# Patient Record
Sex: MALE | Race: WHITE | NOT HISPANIC OR LATINO
[De-identification: names, ages, dates, MRNs, and addresses within clinical notes are randomized per-mention and may not be internally consistent; named-entity substitution may affect disease eponyms.]

---

## 2017-05-24 ENCOUNTER — TRANSCRIPTION ENCOUNTER (OUTPATIENT)
Age: 67
End: 2017-05-24

## 2017-05-24 DIAGNOSIS — R39.9 UNSPECIFIED SYMPTOMS AND SIGNS INVOLVING THE GENITOURINARY SYSTEM: ICD-10-CM

## 2017-05-26 ENCOUNTER — LABORATORY RESULT (OUTPATIENT)
Age: 67
End: 2017-05-26

## 2017-05-26 ENCOUNTER — APPOINTMENT (OUTPATIENT)
Dept: UROLOGY | Facility: CLINIC | Age: 67
End: 2017-05-26

## 2017-05-26 VITALS — BODY MASS INDEX: 27.13 KG/M2 | HEIGHT: 68 IN | WEIGHT: 179 LBS

## 2017-05-26 DIAGNOSIS — Z80.6 FAMILY HISTORY OF LEUKEMIA: ICD-10-CM

## 2017-05-26 DIAGNOSIS — K57.90 DIVERTICULOSIS OF INTESTINE, PART UNSPECIFIED, W/OUT PERFORATION OR ABSCESS W/OUT BLEEDING: ICD-10-CM

## 2017-05-26 DIAGNOSIS — R30.0 DYSURIA: ICD-10-CM

## 2017-05-26 RX ORDER — ALPRAZOLAM 0.25 MG/1
0.25 TABLET ORAL
Refills: 0 | Status: ACTIVE | COMMUNITY

## 2017-05-26 RX ORDER — CICLESONIDE 80 UG/1
80 AEROSOL, METERED RESPIRATORY (INHALATION)
Qty: 6 | Refills: 0 | Status: ACTIVE | COMMUNITY
Start: 2017-01-31

## 2017-05-30 LAB
APPEARANCE: CLEAR
BACTERIA UR CULT: NORMAL
BACTERIA: NEGATIVE
BILIRUBIN URINE: NEGATIVE
BLOOD URINE: NEGATIVE
COLOR: ABNORMAL
GLUCOSE QUALITATIVE U: NORMAL MG/DL
HYALINE CASTS: 1 /LPF
KETONES URINE: NEGATIVE
LEUKOCYTE ESTERASE URINE: NEGATIVE
MICROSCOPIC-UA: NORMAL
NITRITE URINE: NEGATIVE
PH URINE: 5.5
PROTEIN URINE: NEGATIVE MG/DL
PSA SERPL-MCNC: 5.14 NG/ML
RED BLOOD CELLS URINE: 2 /HPF
SPECIFIC GRAVITY URINE: 1.03
SQUAMOUS EPITHELIAL CELLS: 0 /HPF
UROBILINOGEN URINE: NORMAL MG/DL
WHITE BLOOD CELLS URINE: 1 /HPF

## 2017-06-09 ENCOUNTER — APPOINTMENT (OUTPATIENT)
Dept: UROLOGY | Facility: CLINIC | Age: 67
End: 2017-06-09

## 2017-06-12 ENCOUNTER — APPOINTMENT (OUTPATIENT)
Dept: UROLOGY | Facility: CLINIC | Age: 67
End: 2017-06-12

## 2017-06-23 ENCOUNTER — MOBILE ON CALL (OUTPATIENT)
Age: 67
End: 2017-06-23

## 2017-06-23 ENCOUNTER — TRANSCRIPTION ENCOUNTER (OUTPATIENT)
Age: 67
End: 2017-06-23

## 2017-06-27 ENCOUNTER — MEDICATION RENEWAL (OUTPATIENT)
Age: 67
End: 2017-06-27

## 2017-07-24 ENCOUNTER — MEDICATION RENEWAL (OUTPATIENT)
Age: 67
End: 2017-07-24

## 2017-07-31 ENCOUNTER — APPOINTMENT (OUTPATIENT)
Dept: UROLOGY | Facility: CLINIC | Age: 67
End: 2017-07-31
Payer: MEDICARE

## 2017-07-31 PROCEDURE — 99213 OFFICE O/P EST LOW 20 MIN: CPT

## 2017-07-31 RX ORDER — SOLIFENACIN SUCCINATE 5 MG/1
5 TABLET, FILM COATED ORAL
Qty: 30 | Refills: 1 | Status: DISCONTINUED | COMMUNITY
Start: 2017-05-26 | End: 2017-07-31

## 2017-08-01 LAB
APPEARANCE: CLEAR
BACTERIA: NEGATIVE
BILIRUBIN URINE: NEGATIVE
BLOOD URINE: NEGATIVE
COLOR: ABNORMAL
GLUCOSE QUALITATIVE U: NORMAL MG/DL
KETONES URINE: NEGATIVE
LEUKOCYTE ESTERASE URINE: NEGATIVE
MICROSCOPIC-UA: NORMAL
NITRITE URINE: NEGATIVE
PH URINE: 6
PROTEIN URINE: NEGATIVE MG/DL
RED BLOOD CELLS URINE: 1 /HPF
SPECIFIC GRAVITY URINE: 1.02
SQUAMOUS EPITHELIAL CELLS: 0 /HPF
UROBILINOGEN URINE: NORMAL MG/DL
WHITE BLOOD CELLS URINE: 0 /HPF

## 2017-08-29 ENCOUNTER — MEDICATION RENEWAL (OUTPATIENT)
Age: 67
End: 2017-08-29

## 2017-09-22 ENCOUNTER — APPOINTMENT (OUTPATIENT)
Dept: UROLOGY | Facility: CLINIC | Age: 67
End: 2017-09-22

## 2018-03-12 ENCOUNTER — APPOINTMENT (OUTPATIENT)
Dept: UROLOGY | Facility: CLINIC | Age: 68
End: 2018-03-12
Payer: MEDICARE

## 2018-03-12 DIAGNOSIS — R37 SEXUAL DYSFUNCTION, UNSPECIFIED: ICD-10-CM

## 2018-03-12 DIAGNOSIS — R21 RASH AND OTHER NONSPECIFIC SKIN ERUPTION: ICD-10-CM

## 2018-03-12 PROCEDURE — 99214 OFFICE O/P EST MOD 30 MIN: CPT

## 2018-03-13 ENCOUNTER — TRANSCRIPTION ENCOUNTER (OUTPATIENT)
Age: 68
End: 2018-03-13

## 2018-03-13 LAB
PSA FREE FLD-MCNC: 18.3
PSA FREE SERPL-MCNC: 1.07 NG/ML
PSA SERPL-MCNC: 5.85 NG/ML

## 2018-03-21 LAB
4K SCORE CALCULATION: 2 %
FREE PSA: 1.09 NG/ML
PERCENT FREE PSA: 19 %
TOTAL PSA: 5.7 NG/ML

## 2018-04-12 ENCOUNTER — RX RENEWAL (OUTPATIENT)
Age: 68
End: 2018-04-12

## 2018-06-15 ENCOUNTER — OTHER (OUTPATIENT)
Age: 68
End: 2018-06-15

## 2018-06-19 ENCOUNTER — LABORATORY RESULT (OUTPATIENT)
Age: 68
End: 2018-06-19

## 2018-06-19 ENCOUNTER — TRANSCRIPTION ENCOUNTER (OUTPATIENT)
Age: 68
End: 2018-06-19

## 2018-06-21 LAB — BACTERIA UR CULT: NORMAL

## 2018-07-05 ENCOUNTER — RX RENEWAL (OUTPATIENT)
Age: 68
End: 2018-07-05

## 2018-08-28 ENCOUNTER — APPOINTMENT (OUTPATIENT)
Dept: UROLOGY | Facility: CLINIC | Age: 68
End: 2018-08-28
Payer: MEDICARE

## 2018-08-28 ENCOUNTER — LABORATORY RESULT (OUTPATIENT)
Age: 68
End: 2018-08-28

## 2018-08-28 VITALS
HEART RATE: 50 BPM | OXYGEN SATURATION: 94 % | BODY MASS INDEX: 27.58 KG/M2 | WEIGHT: 182 LBS | SYSTOLIC BLOOD PRESSURE: 126 MMHG | DIASTOLIC BLOOD PRESSURE: 69 MMHG | HEIGHT: 68 IN

## 2018-08-28 PROCEDURE — 99213 OFFICE O/P EST LOW 20 MIN: CPT

## 2018-08-29 LAB
APPEARANCE: CLEAR
BACTERIA: NEGATIVE
BILIRUBIN URINE: NEGATIVE
BLOOD URINE: NEGATIVE
COLOR: ABNORMAL
GLUCOSE QUALITATIVE U: NEGATIVE MG/DL
HYALINE CASTS: 3 /LPF
KETONES URINE: NEGATIVE
LEUKOCYTE ESTERASE URINE: NEGATIVE
MICROSCOPIC-UA: NORMAL
NITRITE URINE: NEGATIVE
PH URINE: 5.5
PROTEIN URINE: NEGATIVE MG/DL
PSA SERPL-MCNC: 4.39 NG/ML
RED BLOOD CELLS URINE: 1 /HPF
SPECIFIC GRAVITY URINE: 1.03
SQUAMOUS EPITHELIAL CELLS: 0 /HPF
UROBILINOGEN URINE: NEGATIVE MG/DL
WHITE BLOOD CELLS URINE: 1 /HPF

## 2018-08-30 ENCOUNTER — LABORATORY RESULT (OUTPATIENT)
Age: 68
End: 2018-08-30

## 2018-09-17 ENCOUNTER — APPOINTMENT (OUTPATIENT)
Dept: UROLOGY | Facility: CLINIC | Age: 68
End: 2018-09-17
Payer: MEDICARE

## 2018-09-17 PROCEDURE — 51798 US URINE CAPACITY MEASURE: CPT

## 2018-09-17 PROCEDURE — 99213 OFFICE O/P EST LOW 20 MIN: CPT | Mod: 25

## 2018-09-21 ENCOUNTER — TRANSCRIPTION ENCOUNTER (OUTPATIENT)
Age: 68
End: 2018-09-21

## 2018-09-24 ENCOUNTER — RX RENEWAL (OUTPATIENT)
Age: 68
End: 2018-09-24

## 2018-09-26 ENCOUNTER — RX RENEWAL (OUTPATIENT)
Age: 68
End: 2018-09-26

## 2018-09-26 ENCOUNTER — MOBILE ON CALL (OUTPATIENT)
Age: 68
End: 2018-09-26

## 2018-10-04 ENCOUNTER — TRANSCRIPTION ENCOUNTER (OUTPATIENT)
Age: 68
End: 2018-10-04

## 2018-10-04 ENCOUNTER — RX RENEWAL (OUTPATIENT)
Age: 68
End: 2018-10-04

## 2018-10-04 ENCOUNTER — MOBILE ON CALL (OUTPATIENT)
Age: 68
End: 2018-10-04

## 2019-03-20 ENCOUNTER — TRANSCRIPTION ENCOUNTER (OUTPATIENT)
Age: 69
End: 2019-03-20

## 2019-08-22 LAB
PSA FREE FLD-MCNC: 22 %
PSA FREE SERPL-MCNC: 0.43 NG/ML
PSA SERPL-MCNC: 1.94 NG/ML

## 2019-08-23 ENCOUNTER — APPOINTMENT (OUTPATIENT)
Dept: UROLOGY | Facility: CLINIC | Age: 69
End: 2019-08-23
Payer: MEDICARE

## 2019-08-23 DIAGNOSIS — N50.3 CYST OF EPIDIDYMIS: ICD-10-CM

## 2019-08-23 DIAGNOSIS — B37.2 CANDIDIASIS OF SKIN AND NAIL: ICD-10-CM

## 2019-08-23 LAB
APPEARANCE: CLEAR
BACTERIA: NEGATIVE
BILIRUBIN URINE: NEGATIVE
BLOOD URINE: NEGATIVE
COLOR: YELLOW
GLUCOSE QUALITATIVE U: NEGATIVE
HYALINE CASTS: 0 /LPF
KETONES URINE: NEGATIVE
LEUKOCYTE ESTERASE URINE: NEGATIVE
MICROSCOPIC-UA: NORMAL
NITRITE URINE: NEGATIVE
PH URINE: 6
PROTEIN URINE: NORMAL
RED BLOOD CELLS URINE: 2 /HPF
SPECIFIC GRAVITY URINE: 1.03
SQUAMOUS EPITHELIAL CELLS: 1 /HPF
UROBILINOGEN URINE: NORMAL
WHITE BLOOD CELLS URINE: 1 /HPF

## 2019-08-23 PROCEDURE — 99213 OFFICE O/P EST LOW 20 MIN: CPT | Mod: 25

## 2019-08-23 PROCEDURE — 51798 US URINE CAPACITY MEASURE: CPT

## 2019-08-23 RX ORDER — NYSTATIN AND TRIAMCINOLONE ACETONIDE 100000; 1 MG/G; MG/G
100000-0.1 CREAM TOPICAL TWICE DAILY
Qty: 5 | Refills: 0 | Status: ACTIVE | COMMUNITY
Start: 2019-08-23 | End: 1900-01-01

## 2019-08-23 RX ORDER — FINASTERIDE 5 MG/1
5 TABLET, FILM COATED ORAL DAILY
Qty: 30 | Refills: 1 | Status: ACTIVE | COMMUNITY
Start: 2018-08-28 | End: 1900-01-01

## 2019-08-23 RX ORDER — MIRABEGRON 50 MG/1
50 TABLET, FILM COATED, EXTENDED RELEASE ORAL DAILY
Qty: 90 | Refills: 0 | Status: DISCONTINUED | COMMUNITY
Start: 2017-07-31 | End: 2019-08-23

## 2019-08-23 NOTE — ASSESSMENT
[FreeTextEntry1] : 69 year old with long history of urinary symptoms\par On Uroxatral, finasteride and Myrbetric\par Still complains of frequency and urgency which is variable.\par Discussed attempting to increase Myrbetric.  Patient thinks he had previously attempted and did not tolerate.\par Will attempt and assess.\par \par Discussed asymptomatic epididymal cyst.\par \par BPH on prior biopsy\par PSA reduced on finasteride appropriately\par \par Discussed candidal intertriginous rash\par Will treat with nystatin\par

## 2019-08-23 NOTE — HISTORY OF PRESENT ILLNESS
[FreeTextEntry1] : Patient has had long history of urinary frequency treated with uroxatral.\par Started on vesicare recently\par No clear improvement in urinary frequency \par No clear improvement in nocturia.\par Anxious about driving and symptoms. \par \par Patient is currently experiencing urinary urgency, but no urinary incontinence and no urinary retention \par The patient presents with complaints of urinary frequency (variable somewhat better on vesicare). \par The patient presents with complaints of 2 episodes of nocturia. \par \par 3.12.2018\par on Uroxatral and Myrbetric\par improved urinary symptoms\par symptoms worse when early in the morning\par less of problem in PM\par worse when drinking diet\par \par 8.28.2018\par on Myrbetric \par on Uroxatral\par variable response to medications\par \par  \par 9.17.2018\par this weekend \par increased urinary symptoms\par feeling of incomplete emptying\par no dysuria\par masturbation weekly\par \par  \par 8.23.2019

## 2019-08-23 NOTE — PHYSICAL EXAM
[General Appearance - Well Developed] : well developed [Bowel Sounds] : normal bowel sounds [Urethral Meatus] : meatus normal [Testes Tenderness] : no tenderness of the testes [Penis Abnormality] : normal circumcised penis [Prostate Enlargement] : the prostate was not enlarged [Prostate Tenderness] : the prostate was not tender [Heart Rate And Rhythm] : Heart rate and rhythm were normal [Exaggerated Use Of Accessory Muscles For Inspiration] : no accessory muscle use [Oriented To Time, Place, And Person] : oriented to person, place, and time [Normal Station and Gait] : the gait and station were normal for the patient's age [No Focal Deficits] : no focal deficits [Inguinal Lymph Nodes Enlarged Bilaterally] : inguinal [FreeTextEntry1] : candida rash  intrigonal

## 2019-08-26 DIAGNOSIS — N39.0 URINARY TRACT INFECTION, SITE NOT SPECIFIED: ICD-10-CM

## 2019-08-26 DIAGNOSIS — A49.9 URINARY TRACT INFECTION, SITE NOT SPECIFIED: ICD-10-CM

## 2019-08-26 LAB — BACTERIA UR CULT: ABNORMAL

## 2019-09-07 ENCOUNTER — MOBILE ON CALL (OUTPATIENT)
Age: 69
End: 2019-09-07

## 2019-09-24 ENCOUNTER — APPOINTMENT (OUTPATIENT)
Dept: ORTHOPEDIC SURGERY | Facility: CLINIC | Age: 69
End: 2019-09-24
Payer: MEDICARE

## 2019-09-24 VITALS — WEIGHT: 182 LBS | RESPIRATION RATE: 16 BRPM | HEIGHT: 68 IN | BODY MASS INDEX: 27.58 KG/M2

## 2019-09-24 PROCEDURE — 20605 DRAIN/INJ JOINT/BURSA W/O US: CPT | Mod: RT

## 2019-09-24 PROCEDURE — 73110 X-RAY EXAM OF WRIST: CPT | Mod: 50

## 2019-09-24 PROCEDURE — 99214 OFFICE O/P EST MOD 30 MIN: CPT | Mod: 25

## 2020-05-21 ENCOUNTER — APPOINTMENT (OUTPATIENT)
Dept: UROLOGY | Facility: CLINIC | Age: 70
End: 2020-05-21
Payer: MEDICARE

## 2020-05-21 DIAGNOSIS — A60.00 HERPESVIRAL INFECTION OF UROGENITAL SYSTEM, UNSPECIFIED: ICD-10-CM

## 2020-05-21 DIAGNOSIS — Z85.850 PERSONAL HISTORY OF MALIGNANT NEOPLASM OF THYROID: ICD-10-CM

## 2020-05-21 DIAGNOSIS — Z98.62 PERIPHERAL VASCULAR ANGIOPLASTY STATUS: ICD-10-CM

## 2020-05-21 PROCEDURE — 99214 OFFICE O/P EST MOD 30 MIN: CPT | Mod: 95

## 2020-05-21 RX ORDER — TICAGRELOR 60 MG/1
TABLET ORAL
Refills: 0 | Status: ACTIVE | COMMUNITY

## 2020-05-21 RX ORDER — ASPIRIN 81 MG/1
81 TABLET, CHEWABLE ORAL
Refills: 0 | Status: ACTIVE | COMMUNITY

## 2020-05-21 NOTE — ASSESSMENT
[FreeTextEntry1] : 69 year old with long history of urinary symptoms\par On Uroxatral, finasteride and Myrbetric\par Primary concern is unpredictable nature of symptoms\par When playing golf and occupied does not experience frequency\par DIscussed impact of fluid intake, ETOH etc on variable symptoms.\par Nocturia improved.\par Discussed attempting to increase Myrbetric.  Patient thinks he had previously attempted and did not tolerate.\par Does not want to attempt again\par \par Discussed recurrent hematuria since starting Brilinta\par Sounds like initial hematuria not total but persists for several days.  \par DIscussed fact that since this is occurring in spite of finasteride the need for evaluation with cystoscopy\par Referred to Dr Brenner or Ronnie.\par Emphasized the need for evaluation\par Patient reluctant and emphasized importance\par Patient agrees to proceed.\par \par \par Discussed asymptomatic epididymal cyst.\par \par BPH on prior biopsy\par PSA reduced on finasteride appropriately\par referred to Lon Trujillo ; Kanu Brenner\par \par discussed need for cystoscopy\par Discussed candidal intertriginous rash\par Will treat with nystatin\par

## 2020-05-21 NOTE — HISTORY OF PRESENT ILLNESS
[Other Location: e.g. School (Enter Location, City,State)___] : at [unfilled], at the time of the visit. [Urinary Frequency] : urinary frequency [Nocturia] : nocturia [Home] : at home, [unfilled] , at the time of the visit. [Other Location: e.g. Home (Enter Location, City,State)___] : at [unfilled] [Verbal consent obtained from patient] : the patient, [unfilled] [FreeTextEntry1] : Patient has had long history of urinary frequency treated with uroxatral.\par Started on vesicare recently\par No clear improvement in urinary frequency \par No clear improvement in nocturia.\par Anxious about driving and symptoms. \par \par Patient is currently experiencing urinary urgency, but no urinary incontinence and no urinary retention \par The patient presents with complaints of urinary frequency (variable somewhat better on vesicare). \par The patient presents with complaints of 2 episodes of nocturia. \par \par 3.12.2018\par on Uroxatral and Myrbetric\par improved urinary symptoms\par symptoms worse when early in the morning\par less of problem in PM\par worse when drinking diet\par \par 8.28.2018\par on Myrbetric \par on Uroxatral\par variable response to medications\par \par  \par 9.17.2018\par this weekend \par increased urinary symptoms\par feeling of incomplete emptying\par no dysuria\par masturbation weekly\par \par  \par 8.23.2019\par \par 5.21.2020\par The patient-doctor relationship has been established in a face to face fashion via real time video/audio HIPAA compliant communication using telemedicine software.  The patient's identity has been confirmed.  The patient was previously emailed a copy of the telemedicine consent.  They have had a chance to review and has now given verbal consent and has requested care to be assessed and treated through telemedicine and understands there maybe limitations in this process and they may need further follow up care in the office and or hospital settings.   \par THE FOLLOWING WAS READ TO AND CONSENTED TO BY PATIENT\par By virtue of my participation in this telehealth visit, I am consenting to receive care through telehealth. Telehealth is the use of electronic information and communication technologies by providers to deliver health care to patients at a distance. \par I understand that any care provided to me through [employer]’s telehealth application (“the [facility] candy”) will incorporate security protocols to protect the privacy and security of my health information. If any other application is used to provide care to me, I understand that the technology may not contain appropriate security protocols to protect the privacy and security of my health information. My provider has explained to me the risks associated with the technology platforms that he or she is using to provide care to me. I acknowledge that there are potential risks associated with any technology used while obtaining care through telehealth, including, but not limited to, connectively interruptions, other technical difficulties, and unauthorized access by a third party to one’s health information. Despite these risks, I agree to participate in the telehealth encounter. \par I understand and agree that I or my healthcare provider may terminate a telehealth encounter at any time in the event of a technical malfunction. \par I also understand that my location determines where medicine is being practiced. As a result, I will inform my provider where I am located at the time of my telehealth visit. \par I understand that there may be costs associated with a telehealth visit. I agree that I am responsible for any fees associated with the telehealth services that I receive. \par This Informed Consent for Telehealth Services During a Public Health Emergency will remain in effect solely during the term of the public health emergency.\par \par Verbal consent given on May 21, 2020  12:00PM by Everett Sierra from Jolie RITO MAEBERMAN \par  \par AMWELL NO VIDEO CONNECtiON or AUDIO\par Doximity RESCUE \par \par \par urinary symptom\par on ASA Brilinta\par s/p angioplasty\par s/p thyroid cancer surgery\par hematospermia occasional\par patient states he has had initial hematuria? 4-5 or days in a row

## 2020-05-21 NOTE — HISTORY OF PRESENT ILLNESS
[Other Location: e.g. School (Enter Location, City,State)___] : at [unfilled], at the time of the visit. [Urinary Frequency] : urinary frequency [Nocturia] : nocturia [Home] : at home, [unfilled] , at the time of the visit. [Other Location: e.g. Home (Enter Location, City,State)___] : at [unfilled] [Verbal consent obtained from patient] : the patient, [unfilled] [FreeTextEntry1] : Patient has had long history of urinary frequency treated with uroxatral.\par Started on vesicare recently\par No clear improvement in urinary frequency \par No clear improvement in nocturia.\par Anxious about driving and symptoms. \par \par Patient is currently experiencing urinary urgency, but no urinary incontinence and no urinary retention \par The patient presents with complaints of urinary frequency (variable somewhat better on vesicare). \par The patient presents with complaints of 2 episodes of nocturia. \par \par 3.12.2018\par on Uroxatral and Myrbetric\par improved urinary symptoms\par symptoms worse when early in the morning\par less of problem in PM\par worse when drinking diet\par \par 8.28.2018\par on Myrbetric \par on Uroxatral\par variable response to medications\par \par  \par 9.17.2018\par this weekend \par increased urinary symptoms\par feeling of incomplete emptying\par no dysuria\par masturbation weekly\par \par  \par 8.23.2019\par \par 5.21.2020\par The patient-doctor relationship has been established in a face to face fashion via real time video/audio HIPAA compliant communication using telemedicine software.  The patient's identity has been confirmed.  The patient was previously emailed a copy of the telemedicine consent.  They have had a chance to review and has now given verbal consent and has requested care to be assessed and treated through telemedicine and understands there maybe limitations in this process and they may need further follow up care in the office and or hospital settings.   \par THE FOLLOWING WAS READ TO AND CONSENTED TO BY PATIENT\par By virtue of my participation in this telehealth visit, I am consenting to receive care through telehealth. Telehealth is the use of electronic information and communication technologies by providers to deliver health care to patients at a distance. \par I understand that any care provided to me through [employer]’s telehealth application (“the [employer]”) will incorporate security protocols to protect the privacy and security of my health information. If any other application is used to provide care to me, I understand that the technology may not contain appropriate security protocols to protect the privacy and security of my health information. My provider has explained to me the risks associated with the technology platforms that he or she is using to provide care to me. I acknowledge that there are potential risks associated with any technology used while obtaining care through telehealth, including, but not limited to, connectively interruptions, other technical difficulties, and unauthorized access by a third party to one’s health information. Despite these risks, I agree to participate in the telehealth encounter. \par I understand and agree that I or my healthcare provider may terminate a telehealth encounter at any time in the event of a technical malfunction. \par I also understand that my location determines where medicine is being practiced. As a result, I will inform my provider where I am located at the time of my telehealth visit. \par I understand that there may be costs associated with a telehealth visit. I agree that I am responsible for any fees associated with the telehealth services that I receive. \par This Informed Consent for Telehealth Services During a Public Health Emergency will remain in effect solely during the term of the public health emergency.\par \par Verbal consent given on May 21, 2020  12:00PM by Everett Sierra from Jolie RITO MAEBERMAN \par  \par AMWELL NO VIDEO CONNECtiON or AUDIO\par Doximity RESCUE \par \par \par urinary symptom\par on ASA Brilinta\par s/p angioplasty\par s/p thyroid cancer surgery\par hematospermia occasional\par patient states he has had initial hematuria? 4-5 or days in a row

## 2020-07-02 ENCOUNTER — APPOINTMENT (OUTPATIENT)
Dept: UROLOGY | Facility: CLINIC | Age: 70
End: 2020-07-02
Payer: MEDICARE

## 2020-07-02 VITALS
HEART RATE: 54 BPM | TEMPERATURE: 98.1 F | DIASTOLIC BLOOD PRESSURE: 81 MMHG | HEIGHT: 68 IN | WEIGHT: 184 LBS | BODY MASS INDEX: 27.89 KG/M2 | OXYGEN SATURATION: 98 % | RESPIRATION RATE: 17 BRPM | SYSTOLIC BLOOD PRESSURE: 186 MMHG

## 2020-07-02 VITALS — SYSTOLIC BLOOD PRESSURE: 180 MMHG | HEART RATE: 55 BPM | DIASTOLIC BLOOD PRESSURE: 88 MMHG | OXYGEN SATURATION: 98 %

## 2020-07-02 PROCEDURE — 52000 CYSTOURETHROSCOPY: CPT

## 2020-08-06 ENCOUNTER — APPOINTMENT (OUTPATIENT)
Dept: UROLOGY | Facility: CLINIC | Age: 70
End: 2020-08-06
Payer: MEDICARE

## 2020-08-06 PROCEDURE — 99213 OFFICE O/P EST LOW 20 MIN: CPT | Mod: 95

## 2020-08-06 NOTE — HISTORY OF PRESENT ILLNESS
[Other Location: e.g. School (Enter Location, City,State)___] : at [unfilled], at the time of the visit. [FreeTextEntry1] : Patient has had long history of urinary frequency treated with uroxatral.\par Started on vesicare recently\par No clear improvement in urinary frequency \par No clear improvement in nocturia.\par Anxious about driving and symptoms. \par \par Patient is currently experiencing urinary urgency, but no urinary incontinence and no urinary retention \par The patient presents with complaints of urinary frequency (variable somewhat better on vesicare). \par The patient presents with complaints of 2 episodes of nocturia. \par \par 3.12.2018\par on Uroxatral and Myrbetric\par improved urinary symptoms\par symptoms worse when early in the morning\par less of problem in PM\par worse when drinking diet\par \par 8.28.2018\par on Myrbetric \par on Uroxatral\par variable response to medications\par \par  \par 9.17.2018\par this weekend \par increased urinary symptoms\par feeling of incomplete emptying\par no dysuria\par masturbation weekly\par \par  \par 8.23.2019\par \par 5.21.2020\par urinary symptom\par on ASA Brilinta\par s/p angioplasty\par s/p thyroid cancer surgery\par \par 8.6.2020\par \par patient seen for diarrhea\par found to have ecoli \par had covid test\par treated with cipro resolved diarrha\par The patient-doctor relationship has been established in a face to face fashion via real time video/audio HIPAA compliant communication using telemedicine software.  The patient's identity has been confirmed.  The patient was previously emailed a copy of the telemedicine consent.  They have had a chance to review and has now given verbal consent and has requested care to be assessed and treated through telemedicine and understands there maybe limitations in this process and they may need further follow up care in the office and or hospital settings.   \par THE FOLLOWING WAS READ TO AND CONSENTED TO BY PATIENT\par By virtue of my participation in this telehealth visit, I am consenting to receive care through telehealth. Telehealth is the use of electronic information and communication technologies by providers to deliver health care to patients at a distance. \par I understand that any care provided to me through [employer]’s telehealth application (“the [email or web address] candy”) will incorporate security protocols to protect the privacy and security of my health information. If any other application is used to provide care to me, I understand that the technology may not contain appropriate security protocols to protect the privacy and security of my health information. My provider has explained to me the risks associated with the technology platforms that he or she is using to provide care to me. I acknowledge that there are potential risks associated with any technology used while obtaining care through telehealth, including, but not limited to, connectively interruptions, other technical difficulties, and unauthorized access by a third party to one’s health information. Despite these risks, I agree to participate in the telehealth encounter. \par I understand and agree that I or my healthcare provider may terminate a telehealth encounter at any time in the event of a technical malfunction. \par I also understand that my location determines where medicine is being practiced. As a result, I will inform my provider where I am located at the time of my telehealth visit. \par I understand that there may be costs associated with a telehealth visit. I agree that I am responsible for any fees associated with the telehealth services that I receive. \par This Informed Consent for Telehealth Services During a Public Health Emergency will remain in effect solely during the term of the public health emergency.\par \par Verbal consent given on Aug 06, 2020   4.40PM by Everett Sierra from Mr. RITO DIANE \par \par urinary symptoms seem improved after cipro\par no dysuria\par no intial stinging\par urgency improved\par  \par

## 2020-08-06 NOTE — ASSESSMENT
[FreeTextEntry1] : 69 year old with long history of urinary symptoms\par On Uroxatral, finasteride and Myrbetric\par Primary concern is unpredictable nature of symptoms\par When playing golf and occupied does not experience frequency\par DIscussed impact of fluid intake, ETOH etc on variable symptoms.\par Nocturia improved.\par Discussed attempting to increase Myrbetric.  Patient thinks he had previously attempted and did not tolerate.\par Does not want to attempt again\par \par Discussed recurrent hematuria since starting Brilinta\par Sounds like initial hematuria not total but persists for several days.  \par DIscussed fact that since this is occurring in spite of finasteride the need for evaluation with cystoscopy\par Referred to Dr Brenner or Ronnie.\par Emphasized the need for evaluation\par Patient reluctant and emphasized importance\par Patient agrees to proceed.\par \par \par Discussed asymptomatic epididymal cyst.\par \par BPH on prior biopsy\par PSA reduced on finasteride appropriately\par referred to Lon Trujillo ; Kanu Brenner\par \par discussed need for cystoscopy\par Discussed candidal intertriginous rash\par Will treat with nystatin\par \par \par 8.6.2020\par discussed urinary symptoms\par patient feels improved after cipro\par discussed negative exam , urinalysis and PSA at that time not consistent with prostatitis\par discussed avoiding unnecessary antibiotics\par no evidence for further antibiotics\par continues to have mild GI \par f/u [place] [facility]\par continues on:\par 1. alfuzosin \par 2. myrbetriq\par 3. finasteride\par

## 2020-08-06 NOTE — HISTORY OF PRESENT ILLNESS
[Other Location: e.g. School (Enter Location, City,State)___] : at [unfilled], at the time of the visit. [FreeTextEntry1] : Patient has had long history of urinary frequency treated with uroxatral.\par Started on vesicare recently\par No clear improvement in urinary frequency \par No clear improvement in nocturia.\par Anxious about driving and symptoms. \par \par Patient is currently experiencing urinary urgency, but no urinary incontinence and no urinary retention \par The patient presents with complaints of urinary frequency (variable somewhat better on vesicare). \par The patient presents with complaints of 2 episodes of nocturia. \par \par 3.12.2018\par on Uroxatral and Myrbetric\par improved urinary symptoms\par symptoms worse when early in the morning\par less of problem in PM\par worse when drinking diet\par \par 8.28.2018\par on Myrbetric \par on Uroxatral\par variable response to medications\par \par  \par 9.17.2018\par this weekend \par increased urinary symptoms\par feeling of incomplete emptying\par no dysuria\par masturbation weekly\par \par  \par 8.23.2019\par \par 5.21.2020\par urinary symptom\par on ASA Brilinta\par s/p angioplasty\par s/p thyroid cancer surgery\par \par 8.6.2020\par \par patient seen for diarrhea\par found to have ecoli \par had covid test\par treated with cipro resolved diarrha\par The patient-doctor relationship has been established in a face to face fashion via real time video/audio HIPAA compliant communication using telemedicine software.  The patient's identity has been confirmed.  The patient was previously emailed a copy of the telemedicine consent.  They have had a chance to review and has now given verbal consent and has requested care to be assessed and treated through telemedicine and understands there maybe limitations in this process and they may need further follow up care in the office and or hospital settings.   \par THE FOLLOWING WAS READ TO AND CONSENTED TO BY PATIENT\par By virtue of my participation in this telehealth visit, I am consenting to receive care through telehealth. Telehealth is the use of electronic information and communication technologies by providers to deliver health care to patients at a distance. \par I understand that any care provided to me through [employer]’s telehealth application (“the [employer] candy”) will incorporate security protocols to protect the privacy and security of my health information. If any other application is used to provide care to me, I understand that the technology may not contain appropriate security protocols to protect the privacy and security of my health information. My provider has explained to me the risks associated with the technology platforms that he or she is using to provide care to me. I acknowledge that there are potential risks associated with any technology used while obtaining care through telehealth, including, but not limited to, connectively interruptions, other technical difficulties, and unauthorized access by a third party to one’s health information. Despite these risks, I agree to participate in the telehealth encounter. \par I understand and agree that I or my healthcare provider may terminate a telehealth encounter at any time in the event of a technical malfunction. \par I also understand that my location determines where medicine is being practiced. As a result, I will inform my provider where I am located at the time of my telehealth visit. \par I understand that there may be costs associated with a telehealth visit. I agree that I am responsible for any fees associated with the telehealth services that I receive. \par This Informed Consent for Telehealth Services During a Public Health Emergency will remain in effect solely during the term of the public health emergency.\par \par Verbal consent given on Aug 06, 2020   4.40PM by Everett Sierra from Mr. RITO DIANE \par \par urinary symptoms seem improved after cipro\par no dysuria\par no intial stinging\par urgency improved\par  \par

## 2020-09-22 ENCOUNTER — APPOINTMENT (OUTPATIENT)
Dept: ORTHOPEDIC SURGERY | Facility: CLINIC | Age: 70
End: 2020-09-22
Payer: MEDICARE

## 2020-09-22 ENCOUNTER — APPOINTMENT (OUTPATIENT)
Dept: UROLOGY | Facility: CLINIC | Age: 70
End: 2020-09-22
Payer: MEDICARE

## 2020-09-22 VITALS
SYSTOLIC BLOOD PRESSURE: 129 MMHG | TEMPERATURE: 98.1 F | WEIGHT: 184 LBS | DIASTOLIC BLOOD PRESSURE: 76 MMHG | HEIGHT: 68 IN | OXYGEN SATURATION: 97 % | BODY MASS INDEX: 27.89 KG/M2 | HEART RATE: 55 BPM

## 2020-09-22 VITALS — RESPIRATION RATE: 16 BRPM | HEIGHT: 68 IN | BODY MASS INDEX: 27.89 KG/M2 | WEIGHT: 184 LBS

## 2020-09-22 DIAGNOSIS — R36.9 URETHRAL DISCHARGE, UNSPECIFIED: ICD-10-CM

## 2020-09-22 PROCEDURE — 99213 OFFICE O/P EST LOW 20 MIN: CPT | Mod: 25

## 2020-09-22 PROCEDURE — 73130 X-RAY EXAM OF HAND: CPT | Mod: 50

## 2020-09-22 PROCEDURE — 20605 DRAIN/INJ JOINT/BURSA W/O US: CPT | Mod: RT

## 2020-09-22 PROCEDURE — 99214 OFFICE O/P EST MOD 30 MIN: CPT | Mod: 25

## 2020-09-22 PROCEDURE — 51741 ELECTRO-UROFLOWMETRY FIRST: CPT

## 2020-09-22 NOTE — PHYSICAL EXAM
[Urethral Meatus] : meatus normal [Penis Abnormality] : normal circumcised penis [Epididymis] : the epididymides were normal [Testes Tenderness] : no tenderness of the testes [Prostate Enlargement] : the prostate was not enlarged [Prostate Tenderness] : the prostate was not tender [No Prostate Nodules] : no prostate nodules [FreeTextEntry1] : no blood per urethra; non tender prostate; no blood post examination

## 2020-09-22 NOTE — HISTORY OF PRESENT ILLNESS
[FreeTextEntry1] : Patient has had long history of urinary frequency treated with Uroxatral.\par Started on VESIcare recently\par No clear improvement in urinary frequency \par No clear improvement in nocturia.\par Anxious about driving and symptoms. \par \par Patient is currently experiencing urinary urgency, but no urinary incontinence and no urinary retention \par The patient presents with complaints of urinary frequency (variable somewhat better on VESIcare). \par The patient presents with complaints of 2 episodes of nocturia. \par \par 3.12.2018\par on Uroxatral and Myrbetric\par improved urinary symptoms\par symptoms worse when early in the morning\par less of problem in PM\par worse when drinking diet\par \par 8.28.2018\par on Myrbetric \par on Uroxatral\par variable response to medications\par \par  \par 9.17.2018\par this weekend \par increased urinary symptoms\par feeling of incomplete emptying\par no dysuria\par masturbation weekly\par \par  \par 8.23.2019\par \par 5.21.2020\par urinary symptom\par on ASA Brilinta\par s/p angioplasty\par s/p thyroid cancer surgery\par \par 8.6.2020\par \par patient seen for diarrhea\par found to have Ecolid \par had covid test\par treated with Cipro resolved diarrhea\par The patient-doctor relationship has been established in a face to face fashion via real time video/audio HIPAA compliant communication using telemedicine software.  The patient's identity has been confirmed.  The patient was previously emailed a copy of the telemedicine consent.  They have had a chance to review and has now given verbal consent and has requested care to be assessed and treated through telemedicine and understands there maybe limitations in this process and they may need further follow up care in the office and or hospital settings.   \par THE FOLLOWING WAS READ TO AND CONSENTED TO BY PATIENT\par By virtue of my participation in this telehealth visit, I am consenting to receive care through telehealth. Telehealth is the use of electronic information and communication technologies by providers to deliver health care to patients at a distance. \par I understand that any care provided to me through [employer]’s telehealth application (“the [employer] candy”) will incorporate security protocols to protect the privacy and security of my health information. If any other application is used to provide care to me, I understand that the technology may not contain appropriate security protocols to protect the privacy and security of my health information. My provider has explained to me the risks associated with the technology platforms that he or she is using to provide care to me. I acknowledge that there are potential risks associated with any technology used while obtaining care through telehealth, including, but not limited to, connectively interruptions, other technical difficulties, and unauthorized access by a third party to one’s health information. Despite these risks, I agree to participate in the telehealth encounter. \par I understand and agree that I or my healthcare provider may terminate a telehealth encounter at any time in the event of a technical malfunction. \par I also understand that my location determines where medicine is being practiced. As a result, I will inform my provider where I am located at the time of my telehealth visit. \par I understand that there may be costs associated with a telehealth visit. I agree that I am responsible for any fees associated with the telehealth services that I receive. \par This Informed Consent for Telehealth Services During a Public Health Emergency will remain in effect solely during the term of the public health emergency.\par \par Verbal consent given on Aug 06, 2020   4.40PM by Everett Sierra from Jolie DIANE \par \par urinary symptoms seem improved after Cipro\par no dysuria\par no initial stinging\par urgency improved\par  \par 9.22.2020\par patient presents with approximately on week of blood per urethra with initial hematuria\par His urine is clear\par Started when changed from Eliquis  to Plavix\par At same time had ? mild trauma to urethra over edge of traveling urinal

## 2020-09-22 NOTE — ASSESSMENT
[FreeTextEntry1] : 69 year old with long history of urinary symptoms\par On Uroxatral, finasteride and Myrbetric\par Primary concern is unpredictable nature of symptoms\par When playing golf and occupied does not experience frequency\par DIscussed impact of fluid intake, ETOH etc on variable symptoms.\par Nocturia improved.\par Discussed attempting to increase Myrbetric.  Patient thinks he had previously attempted and did not tolerate.\par Does not want to attempt again\par \par Discussed recurrent hematuria since starting Brilinta\par Sounds like initial hematuria not total but persists for several days.  \par DIscussed fact that since this is occurring in spite of finasteride the need for evaluation with cystoscopy\par Referred to Dr Brenner or Ronnie.\par Emphasized the need for evaluation\par Patient reluctant and emphasized importance\par Patient agrees to proceed.\par \par \par Discussed asymptomatic epididymal cyst.\par \par BPH on prior biopsy\par PSA reduced on finasteride appropriately\par referred to Lon Trujillo ; Kanu Brenner\par \par discussed need for cystoscopy\par Discussed candidal intertriginous rash\par Will treat with nystatin\par \par \par 8.6.2020\par discussed urinary symptoms\par patient feels improved after cipro\par discussed negative exam , urinalysis and PSA at that time not consistent with prostatitis\par discussed avoiding unnecessary antibiotics\par no evidence for further antibiotics\par continues to have mild GI \par f/u [place] [facility]\par continues on:\par 1. alfuzosin \par 2. myrbetriq\par 3. finasteride\par \par \par 9.22.2020\par patient presents with approximately on week of blood per urethra with initial hematuria\par His urine is clear\par Started when changed from Eliquis  to Plavix\par At same time had ? mild trauma to urethra over edge of traveling urinal  \par no pain; decreasing and not with each urination\par urinalysis rbc with wbc; occ bacteria\par No evidence of infection on exam \par discussed ? minor trauma on antiplatelet and anticoagulant therapy\par avoid exercise etc\par will not treat as if infection because of above and recent Ecoli GI infection\par if persists will proceed with repeat cystoscopy\par \par flow noted/reviewed

## 2020-09-24 LAB — BACTERIA UR CULT: NORMAL

## 2020-12-21 PROBLEM — N39.0 UTI (URINARY TRACT INFECTION), BACTERIAL: Status: RESOLVED | Noted: 2019-08-26 | Resolved: 2020-12-21

## 2021-08-09 ENCOUNTER — APPOINTMENT (OUTPATIENT)
Dept: UROLOGY | Facility: CLINIC | Age: 71
End: 2021-08-09
Payer: MEDICARE

## 2021-08-09 VITALS — TEMPERATURE: 98.3 F

## 2021-08-09 PROCEDURE — 51741 ELECTRO-UROFLOWMETRY FIRST: CPT

## 2021-08-09 PROCEDURE — 99214 OFFICE O/P EST MOD 30 MIN: CPT

## 2021-08-09 PROCEDURE — 51798 US URINE CAPACITY MEASURE: CPT

## 2021-08-09 NOTE — ASSESSMENT
[FreeTextEntry1] : 69 year old with long history of urinary symptoms\par On Uroxatral, finasteride and Myrbetric\par Primary concern is unpredictable nature of symptoms\par When playing golf and occupied does not experience frequency\par DIscussed impact of fluid intake, ETOH etc on variable symptoms.\par Nocturia improved.\par Discussed attempting to increase Myrbetric.  Patient thinks he had previously attempted and did not tolerate.\par Does not want to attempt again\par \par Discussed recurrent hematuria since starting Brilinta\par Sounds like initial hematuria not total but persists for several days.  \par DIscussed fact that since this is occurring in spite of finasteride the need for evaluation with cystoscopy\par Referred to Dr Brenner or Ronnie.\par Emphasized the need for evaluation\par Patient reluctant and emphasized importance\par Patient agrees to proceed.\par \par \par Discussed asymptomatic epididymal cyst.\par \par BPH on prior biopsy\par PSA reduced on finasteride appropriately\par referred to Lon Trujillo ; Kanu Brnener\par \par discussed need for cystoscopy\par Discussed candidal intertriginous rash\par Will treat with nystatin\par \par \par 8.6.2020\par discussed urinary symptoms\par patient feels improved after cipro\par discussed negative exam , urinalysis and PSA at that time not consistent with prostatitis\par discussed avoiding unnecessary antibiotics\par no evidence for further antibiotics\par continues to have mild GI \par f/u [place] [facility]iste\par continues on:\par 1. alfuzosin \par 2. myrbetriq\par 3. finasteride\par \par \par 9.22.2020\par patient presents with approximately on week of blood per urethra with initial hematuria\par His urine is clear\par Started when changed from Eliquis  to Plavix\par At same time had ? mild trauma to urethra over edge of traveling urinal  \par no pain; decreasing and not with each urination\par urinalysis rbc with wbc; occ bacteria\par No evidence of infection on exam \par discussed ? minor trauma on antiplatelet and anticoagulant therapy\par avoid exercise etc\par will not treat as if infection because of above and recent Ecoli GI infection\par if persists will proceed with repeat cystoscopy\par \par flow noted/reviewed\par \par 8.9.2021\par voided volume 231cc\par max flow 13.3\par \par \par urine and culture negative\par no evidence of infection\par (patient felt he had UTI and wanted antibiotic)\par \par patient presents with significant urinary symptoms \par His flow is 13.3 cc .sec with increased .\par His quality of life is adversely effected with inability to travel etc.\par Options I discussed with RITO DIANE the possible indications for treatment of prostatic obstruction including:\par 1 urinary symptoms and quality of life issues\par 2.  Obstruction and urinary retention\par 3.  Infections\par 4.  Bladder stones\par 5.  Upper tract changes including hydronephrosis and renal dysfunction\par We discussed the indications in his case.\par \par We had an extensive discussion about treatment options\par We discussed surgical and minimally invasive approaches  UROLIFT and Rezume\par Surgical procedures discussed included transurethral resection of the prostate, open prostatectomy and robot assisted procedures.\par we discussed risks and complications of surgical procedures including:\par 1 hematuria potential requiring transfusion \par 2.  Urinary incontinence\par 3.  Retrograde ejaculation and sexual dysfunction\par 4.  Inability to urinate after the procedure\par 5.  Risks of surgery and anesthesia including but not limited to death\par \par We discussed medical management with alpha blocking agents (alfuzosin, tamsulosin doxazosin etc.)\par We discussed that he is on maximal medical intervention\par We discussed surgical intervention TURP vs minimally invasive procedures advantages and disadvantages\par \par discussed proceeding with :\par 1. renal usg (mother had RCC)\par 2. cystoscopy re LUTS and blood per urethra (Blood per urethra seems to always follow ejaculation\par 3. PSA \par

## 2021-08-09 NOTE — HISTORY OF PRESENT ILLNESS
[FreeTextEntry1] : Patient has had long history of urinary frequency treated with Uroxatral.\par Started on VESIcare recently\par No clear improvement in urinary frequency \par No clear improvement in nocturia.\par Anxious about driving and symptoms. \par \par Patient is currently experiencing urinary urgency, but no urinary incontinence and no urinary retention \par The patient presents with complaints of urinary frequency (variable somewhat better on VESIcare). \par The patient presents with complaints of 2 episodes of nocturia. \par \par 3.12.2018\par on Uroxatral and Myrbetric\par improved urinary symptoms\par symptoms worse when early in the morning\par less of problem in PM\par worse when drinking diet\par \par 8.28.2018\par on Myrbetric \par on Uroxatral\par variable response to medications\par \par  \par 9.17.2018\par this weekend \par increased urinary symptoms\par feeling of incomplete emptying\par no dysuria\par masturbation weekly\par \par  \par 8.23.2019\par \par 5.21.2020\par urinary symptom\par on ASA Brilinta\par s/p angioplasty\par s/p thyroid cancer surgery\par \par 8.6.2020\par \par patient seen for diarrhea\par found to have Ecolid \par had covid test\par treated with Cipro resolved diarrhea\par The patient-doctor relationship has been established in a face to face fashion via real time video/audio HIPAA compliant communication using telemedicine software.  The patient's identity has been confirmed.  The patient was previously emailed a copy of the telemedicine consent.  They have had a chance to review and has now given verbal consent and has requested care to be assessed and treated through telemedicine and understands there maybe limitations in this process and they may need further follow up care in the office and or hospital settings.   \par THE FOLLOWING WAS READ TO AND CONSENTED TO BY PATIENT\par By virtue of my participation in this telehealth visit, I am consenting to receive care through telehealth. Telehealth is the use of electronic information and communication technologies by providers to deliver health care to patients at a distance. \par I understand that any care provided to me through [employer]’s telehealth application (“the [employer] candy”) will incorporate security protocols to protect the privacy and security of my health information. If any other application is used to provide care to me, I understand that the technology may not contain appropriate security protocols to protect the privacy and security of my health information. My provider has explained to me the risks associated with the technology platforms that he or she is using to provide care to me. I acknowledge that there are potential risks associated with any technology used while obtaining care through telehealth, including, but not limited to, connectively interruptions, other technical difficulties, and unauthorized access by a third party to one’s health information. Despite these risks, I agree to participate in the telehealth encounter. \par I understand and agree that I or my healthcare provider may terminate a telehealth encounter at any time in the event of a technical malfunction. \par I also understand that my location determines where medicine is being practiced. As a result, I will inform my provider where I am located at the time of my telehealth visit. \par I understand that there may be costs associated with a telehealth visit. I agree that I am responsible for any fees associated with the telehealth services that I receive. \par This Informed Consent for Telehealth Services During a Public Health Emergency will remain in effect solely during the term of the public health emergency.\par \par Verbal consent given on Aug 06, 2020   4.40PM by Everett Sierra from Mr. RITO DIANE \par \par urinary symptoms seem improved after Cipro\par no dysuria\par no initial stinging\par urgency improved\par  \par 9.22.2020\par patient presents with approximately on week of blood per urethra with initial hematuria\par His urine is clear\par Started when changed from Eliquis  to Plavix\par At same time had ? mild trauma to urethra over edge of traveling urinal\par \par 8.9.2021\par complaining of initial hematuria\par continues to complain of urgency and frequency\par not taling Plavix or eliquis\par hematospermia\par initial hematuria is always asociated with prior hematospermia\par continues on :\par 1. alfuzosin\par 2. myrbetrc\par 3. finasteride

## 2021-08-09 NOTE — PHYSICAL EXAM
[Urethral Meatus] : meatus normal [Penis Abnormality] : normal circumcised penis [Epididymis] : the epididymides were normal [Testes Tenderness] : no tenderness of the testes [Prostate Enlargement] : the prostate was not enlarged [Prostate Tenderness] : the prostate was not tender [No Prostate Nodules] : no prostate nodules [FreeTextEntry1] : no blood per urethra; non tender prostate; no blood post examination;

## 2021-08-10 ENCOUNTER — NON-APPOINTMENT (OUTPATIENT)
Age: 71
End: 2021-08-10

## 2021-08-17 ENCOUNTER — APPOINTMENT (OUTPATIENT)
Dept: UROLOGY | Facility: CLINIC | Age: 71
End: 2021-08-17
Payer: MEDICARE

## 2021-08-17 VITALS — TEMPERATURE: 98.3 F | DIASTOLIC BLOOD PRESSURE: 88 MMHG | HEART RATE: 56 BPM | SYSTOLIC BLOOD PRESSURE: 158 MMHG

## 2021-08-17 PROCEDURE — 99213 OFFICE O/P EST LOW 20 MIN: CPT | Mod: 25

## 2021-08-17 PROCEDURE — 52000 CYSTOURETHROSCOPY: CPT

## 2021-08-17 PROCEDURE — 76705 ECHO EXAM OF ABDOMEN: CPT

## 2021-08-17 NOTE — ASSESSMENT
[FreeTextEntry1] : 69 year old with long history of urinary symptoms\par On Uroxatral, finasteride and Myrbetric\par Primary concern is unpredictable nature of symptoms\par When playing golf and occupied does not experience frequency\par DIscussed impact of fluid intake, ETOH etc on variable symptoms.\par Nocturia improved.\par Discussed attempting to increase Myrbetric.  Patient thinks he had previously attempted and did not tolerate.\par Does not want to attempt again\par \par Discussed recurrent hematuria since starting Brilinta\par Sounds like initial hematuria not total but persists for several days.  \par DIscussed fact that since this is occurring in spite of finasteride the need for evaluation with cystoscopy\par Referred to Dr Brenner or Ronnie.\par Emphasized the need for evaluation\par Patient reluctant and emphasized importance\par Patient agrees to proceed.\par \par \par Discussed asymptomatic epididymal cyst.\par \par BPH on prior biopsy\par PSA reduced on finasteride appropriately\par referred to Lon Trujillo ; Kanu Brenner\par \par discussed need for cystoscopy\par Discussed candidal intertriginous rash\par Will treat with nystatin\par \par \par 8.6.2020\par discussed urinary symptoms\par patient feels improved after cipro\par discussed negative exam , urinalysis and PSA at that time not consistent with prostatitis\par discussed avoiding unnecessary antibiotics\par no evidence for further antibiotics\par continues to have mild GI \par f/u [place] [facility]iste\par continues on:\par 1. alfuzosin \par 2. myrbetriq\par 3. finasteride\par \par \par 9.22.2020\par patient presents with approximately on week of blood per urethra with initial hematuria\par His urine is clear\par Started when changed from Eliquis  to Plavix\par At same time had ? mild trauma to urethra over edge of traveling urinal  \par no pain; decreasing and not with each urination\par urinalysis rbc with wbc; occ bacteria\par No evidence of infection on exam \par discussed ? minor trauma on antiplatelet and anticoagulant therapy\par avoid exercise etc\par will not treat as if infection because of above and recent Ecoli GI infection\par if persists will proceed with repeat cystoscopy\par \par flow noted/reviewed\par \par 8.9.2021\par voided volume 231cc\par max flow 13.3\par \par \par urine and culture negative\par no evidence of infection\par (patient felt he had UTI and wanted antibiotic)\par \par patient presents with significant urinary symptoms \par His flow is 13.3 cc .sec with increased .\par His quality of life is adversely effected with inability to travel etc.\par Options I discussed with RITO DIANE the possible indications for treatment of prostatic obstruction including:\par 1 urinary symptoms and quality of life issues\par 2.  Obstruction and urinary retention\par 3.  Infections\par 4.  Bladder stones\par 5.  Upper tract changes including hydronephrosis and renal dysfunction\par We discussed the indications in his case.\par \par We had an extensive discussion about treatment options\par We discussed surgical and minimally invasive approaches  UROLIFT and Rezume\par Surgical procedures discussed included transurethral resection of the prostate, open prostatectomy and robot assisted procedures.\par we discussed risks and complications of surgical procedures including:\par 1 hematuria potential requiring transfusion \par 2.  Urinary incontinence\par 3.  Retrograde ejaculation and sexual dysfunction\par 4.  Inability to urinate after the procedure\par 5.  Risks of surgery and anesthesia including but not limited to death\par \par We discussed medical management with alpha blocking agents (alfuzosin, tamsulosin doxazosin etc.)\par We discussed that he is on maximal medical intervention\par We discussed surgical intervention TURP vs minimally invasive procedures advantages and disadvantages\par \par discussed proceeding with :\par 1. renal usg (mother had RCC)\par 2. cystoscopy re LUTS and blood per urethra (Blood per urethra seems to always follow ejaculation\par 3. PSA \par \par \par 8.18.2021\par #1 renal ultrasound today does not demonstrate any hydronephrosis.  It does demonstrate a left lower pole cyst.  Is septated.  It is thick-walled.  We discussed this.  We decided to proceed with a CT study.  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  There are no tumors prostatic urethra is obstructing without other abnormality.  Discussed at length the various surgical interventions for prostate outlet obstruction including; 1.  TURP, 2.  UroLift, 3.  Rezum\par We discussed indications for intervention including his marked urinary symptoms.  Patient is unwilling to proceed with any intervention at this time.\par \par He also complains of persistent hematospermia as well as some hematuria.  As result of this he will undergo CT urogram as discussed above.  He will also undergone transrectal ultrasound evaluation of his prostate and seminal vesicles.

## 2021-08-20 DIAGNOSIS — Z87.448 PERSONAL HISTORY OF OTHER DISEASES OF URINARY SYSTEM: ICD-10-CM

## 2021-08-28 ENCOUNTER — TRANSCRIPTION ENCOUNTER (OUTPATIENT)
Age: 71
End: 2021-08-28

## 2021-08-30 ENCOUNTER — NON-APPOINTMENT (OUTPATIENT)
Age: 71
End: 2021-08-30

## 2021-08-31 ENCOUNTER — APPOINTMENT (OUTPATIENT)
Dept: UROLOGY | Facility: CLINIC | Age: 71
End: 2021-08-31
Payer: MEDICARE

## 2021-08-31 DIAGNOSIS — R33.9 RETENTION OF URINE, UNSPECIFIED: ICD-10-CM

## 2021-08-31 DIAGNOSIS — R36.1 HEMATOSPERMIA: ICD-10-CM

## 2021-08-31 PROCEDURE — 99215 OFFICE O/P EST HI 40 MIN: CPT | Mod: 95

## 2021-08-31 NOTE — HISTORY OF PRESENT ILLNESS
[Home] : at home, [unfilled] , at the time of the visit. [Medical Office: ([facility])___] : at the medical office located in  [Verbal consent obtained from patient] : the patient, [unfilled] [FreeTextEntry1] : 71 year old man with history of sigmoidectomy, thyroidectomy, severe COVID-19 infection, long history of urinary symptoms on alfuzosin, finasteride and mirabegron with ongoing bothersome frequency, urgency, nocturia, incomplete emptying and weak stream. He has had recurrent gross hematuria and gross hematospermia as well. No recent fevers, chills, dysuria, flank pain. \par \par Cystoscopy 8/18/21: trilobar hypertrophy with trabeculated bladder and diverticulum\par \par Renal ultrasound 8/18/21: complex left lower pole cyst, no hydronephrosis\par \par CT 8/24/21: bilateral hydroureter, markedly enlarged prostate, thickened bladder wall, bilateral simple renal cysts\par \par Uroflow 8/9/21: Qmax 13.3, voided volume 231, \par

## 2021-08-31 NOTE — ASSESSMENT
[FreeTextEntry1] : Assessment: Mr. RITO DIANE  is a 71 year year old man with recurrent gross hematuria, hematospermia, severe bothersome LUTS despite alfuzosin, finasteride and mirabegron, markedly enlarged prostate on CT (unclear volume at this time) with thickened bladder and bilateral hydroureter. This is most likely due to bladder outlet obstruction secondary to his enlarged prostate, although it is difficult to assess the role of bladder dysfunction, contributing to his symptoms.\par \par Plan: I spent this consultation with Mr. DIANE discussing the causes, sequelae, and management of an enlarged prostate. I explained that enlargement of the prostate is common among men and there is increasing prevalence among older men. The severity of symptoms of an enlarged prostate, however, can vary widely. These symptoms are often "obstructive," characterized by weak force of stream, straining, and hesitancy, due to the impediment to bladder emptying. Additionally, over time, the bladder itself may change becoming thick-walled, less compliant, overactive with a lower capacity resulting in the "irritative" symptoms of urinary frequency and urgency. In the long term, the bladder muscles can start to become weaker and lead to the inability to empty the bladder and the inability to urinate at all. \par \par Medical management with alpha-blockers, which facilitates bladder emptying, is the first-line therapy. Furthermore, 5-alpha reductase inhibitors to shrink the prostate and/or anticholinergics to relax the bladder may also be added. Indications for surgical intervention include urinary retention, urinary tract infection, evidence of uropathy and worsening renal function, bladder stones, and gross hematuria.\par \par We discussed different therapeutic options including TURP, PVP, simple prostatectomy (open, laparoscopic or transurethral laser enucleation), Aquablation, Urolift therapy, and Rezum in addition to full continuation of medical therapy. Discussed the issues of incontinence, retrograde ejaculation, erectile dysfunction, irritative voiding symptoms, injury to urethra and bladder, persistence of irritative voiding symptoms, urethral stricture or bladder neck contracture, need for open surgery to repair the injury, erectile dysfunction and infertility.\par \par I made it clear that because of his large prostate size (exact size will be determined when CT images are available), AUA recommendation is to perform a simple prostatectomy due to the ability to obtain effective and durable improvement in voiding symptoms. A simple prostatectomy can be performed via an open approach, laparoscopic approach or transurethral laser enucleation approach. We discussed the risks and benefits of each approach. Overall, the long term outcomes are similar. However, the laser enucleation procedure has the least morbidity with quickest recovery of the three options. Therefore, I have recommended laser enucleation of the prostate performed via a transurethral approach.\par \par Mr. DIANE decided to proceed with GreenLight laser enucleation of prostate followed by prostate morcellation. Therefore, I spent a good amount of time discussing the risks and benefits of this procedure. We discussed potential risks of incontinence, retrograde ejaculation and infertility, erectile dysfunction, irritative voiding symptoms, injury to the urethra and bladder, rare need to convert to an open surgery.\par \par Patient would like to plan for surgery in October. He will present to the office for pre-op evaluation 2 weeks prior to surgery. We will obtain his CT images for review. He will continue with his current urologic medications for now. He will need a creatinine check to ensure no renal insufficiency as this may require catheter placement or more urgent surgery.\par \par \par \par

## 2021-09-09 ENCOUNTER — NON-APPOINTMENT (OUTPATIENT)
Age: 71
End: 2021-09-09

## 2021-09-15 ENCOUNTER — APPOINTMENT (OUTPATIENT)
Dept: UROLOGY | Facility: CLINIC | Age: 71
End: 2021-09-15
Payer: MEDICARE

## 2021-09-15 VITALS
BODY MASS INDEX: 27.89 KG/M2 | SYSTOLIC BLOOD PRESSURE: 144 MMHG | RESPIRATION RATE: 16 BRPM | OXYGEN SATURATION: 98 % | DIASTOLIC BLOOD PRESSURE: 83 MMHG | HEIGHT: 68 IN | HEART RATE: 50 BPM | TEMPERATURE: 97.8 F | WEIGHT: 184 LBS

## 2021-09-15 DIAGNOSIS — R31.0 GROSS HEMATURIA: ICD-10-CM

## 2021-09-15 PROCEDURE — 99215 OFFICE O/P EST HI 40 MIN: CPT

## 2021-09-15 NOTE — ASSESSMENT
[FreeTextEntry1] : Assessment: Mr. RITO DIANE is a 71 year year old man with recurrent gross hematuria, hematospermia, severe bothersome LUTS despite alfuzosin, finasteride and mirabegron, markedly enlarged prostate on CT (170 cc) with thickened bladder and bilateral hydroureter. This is most likely due to bladder outlet obstruction secondary to his enlarged prostate, although it is difficult to assess the role of bladder dysfunction, contributing to his symptoms.\par \par Plan: I spent this consultation with Mr. DIANE discussing the causes, sequelae, and management of an enlarged prostate. I explained that enlargement of the prostate is common among men and there is increasing prevalence among older men. The severity of symptoms of an enlarged prostate, however, can vary widely. These symptoms are often "obstructive," characterized by weak force of stream, straining, and hesitancy, due to the impediment to bladder emptying. Additionally, over time, the bladder itself may change becoming thick-walled, less compliant, overactive with a lower capacity resulting in the "irritative" symptoms of urinary frequency and urgency. In the long term, the bladder muscles can start to become weaker and lead to the inability to empty the bladder and the inability to urinate at all. \par \par Medical management with alpha-blockers, which facilitates bladder emptying, is the first-line therapy. Furthermore, 5-alpha reductase inhibitors to shrink the prostate and/or anticholinergics to relax the bladder may also be added. Indications for surgical intervention include urinary retention, urinary tract infection, evidence of uropathy and worsening renal function, bladder stones, and gross hematuria.\par \par We discussed different therapeutic options including TURP, PVP, simple prostatectomy (open, laparoscopic or transurethral laser enucleation), Aquablation, Urolift therapy, and Rezum in addition to full continuation of medical therapy. Discussed the issues of incontinence, retrograde ejaculation, erectile dysfunction, irritative voiding symptoms, injury to urethra and bladder, persistence of irritative voiding symptoms, urethral stricture or bladder neck contracture, need for open surgery to repair the injury, erectile dysfunction and infertility.\par \par I made it clear that because of his large prostate size (exact size will be determined when CT images are available), AUA recommendation is to perform a simple prostatectomy due to the ability to obtain effective and durable improvement in voiding symptoms. A simple prostatectomy can be performed via an open approach, laparoscopic approach or transurethral laser enucleation approach. We discussed the risks and benefits of each approach. Overall, the long term outcomes are similar. However, the laser enucleation procedure has the least morbidity with quickest recovery of the three options. Therefore, I have recommended laser enucleation of the prostate performed via a transurethral approach.\par \par Mr. DIANE decided to proceed with GreenLight laser enucleation of prostate followed by prostate morcellation. Therefore, I spent a good amount of time discussing the risks and benefits of this procedure. We discussed potential risks of incontinence, retrograde ejaculation and infertility, erectile dysfunction, irritative voiding symptoms, injury to the urethra and bladder, rare need to convert to an open surgery.\par \par  - OR for GLEP\par  - Pre-op bloodwork, urine culture obtained today\par  - Will need PCP/cardiac clearance\par

## 2021-09-15 NOTE — LETTER BODY
[Dear  ___] : Dear  [unfilled], [Courtesy Letter:] : I had the pleasure of seeing your patient, [unfilled], in my office today. [Please see my note below.] : Please see my note below. [Consult Closing:] : Thank you very much for allowing me to participate in the care of this patient.  If you have any questions, please do not hesitate to contact me. [Sincerely,] : Sincerely, [FreeTextEntry3] : Tashia Mclaughlin MD

## 2021-09-15 NOTE — PHYSICAL EXAM
[General Appearance - Well Developed] : well developed [General Appearance - Well Nourished] : well nourished [Normal Appearance] : normal appearance [Well Groomed] : well groomed [General Appearance - In No Acute Distress] : no acute distress [] : no respiratory distress [Respiration, Rhythm And Depth] : normal respiratory rhythm and effort [Exaggerated Use Of Accessory Muscles For Inspiration] : no accessory muscle use [Oriented To Time, Place, And Person] : oriented to person, place, and time [Mood] : the mood was normal [Affect] : the affect was normal [Not Anxious] : not anxious

## 2021-09-15 NOTE — HISTORY OF PRESENT ILLNESS
[FreeTextEntry1] : 71 year old man with history of sigmoidectomy, thyroidectomy, severe COVID-19 infection, long history of urinary symptoms on alfuzosin, finasteride and mirabegron with ongoing bothersome frequency, urgency, nocturia, incomplete emptying and weak stream. He has had recurrent gross hematuria and gross hematospermia as well. No recent fevers, chills, dysuria, flank pain. \par \par Cystoscopy 8/18/21: trilobar hypertrophy with trabeculated bladder and diverticulum\par \par Renal ultrasound 8/18/21: complex left lower pole cyst, no hydronephrosis\par \par CT 8/24/21: bilateral hydroureter, markedly enlarged prostate (170 cc), thickened bladder wall, bilateral simple renal cysts\par \par Uroflow 8/9/21: Qmax 13.3, voided volume 231, \par \par 9/15/21: Presents to discuss Laser enucleation of the prostate. No changes to health.\par \par IPSS 17, QOL 5, JET 15\par

## 2021-09-21 ENCOUNTER — APPOINTMENT (OUTPATIENT)
Dept: UROLOGY | Facility: CLINIC | Age: 71
End: 2021-09-21

## 2021-09-27 ENCOUNTER — TRANSCRIPTION ENCOUNTER (OUTPATIENT)
Age: 71
End: 2021-09-27

## 2021-09-28 ENCOUNTER — NON-APPOINTMENT (OUTPATIENT)
Age: 71
End: 2021-09-28

## 2021-09-28 LAB
ALBUMIN SERPL ELPH-MCNC: 4.7 G/DL
ALP BLD-CCNC: 69 U/L
ALT SERPL-CCNC: 28 U/L
ANION GAP SERPL CALC-SCNC: 17 MMOL/L
APTT BLD: 32.3 SEC
AST SERPL-CCNC: 24 U/L
BACTERIA UR CULT: NORMAL
BASOPHILS # BLD AUTO: 0.01 K/UL
BASOPHILS NFR BLD AUTO: 0.3 %
BILIRUB SERPL-MCNC: 0.8 MG/DL
BUN SERPL-MCNC: 14 MG/DL
CALCIUM SERPL-MCNC: 9.2 MG/DL
CHLORIDE SERPL-SCNC: 103 MMOL/L
CO2 SERPL-SCNC: 24 MMOL/L
CREAT SERPL-MCNC: 0.78 MG/DL
EOSINOPHIL # BLD AUTO: 0.02 K/UL
EOSINOPHIL NFR BLD AUTO: 0.6 %
GLUCOSE SERPL-MCNC: 93 MG/DL
HCT VFR BLD CALC: 46.1 %
HGB BLD-MCNC: 15.1 G/DL
IMM GRANULOCYTES NFR BLD AUTO: 0.3 %
INR PPP: 0.97 RATIO
LYMPHOCYTES # BLD AUTO: 1.12 K/UL
LYMPHOCYTES NFR BLD AUTO: 32.2 %
MAN DIFF?: NORMAL
MCHC RBC-ENTMCNC: 29.7 PG
MCHC RBC-ENTMCNC: 32.8 GM/DL
MCV RBC AUTO: 90.7 FL
MONOCYTES # BLD AUTO: 0.5 K/UL
MONOCYTES NFR BLD AUTO: 14.4 %
NEUTROPHILS # BLD AUTO: 1.82 K/UL
NEUTROPHILS NFR BLD AUTO: 52.2 %
PLATELET # BLD AUTO: 210 K/UL
POTASSIUM SERPL-SCNC: 4.9 MMOL/L
PROT SERPL-MCNC: 6.9 G/DL
PSA FREE FLD-MCNC: 18 %
PSA FREE SERPL-MCNC: 0.41 NG/ML
PSA SERPL-MCNC: 2.33 NG/ML
PT BLD: 11.5 SEC
RBC # BLD: 5.08 M/UL
RBC # FLD: 14.4 %
SODIUM SERPL-SCNC: 144 MMOL/L
WBC # FLD AUTO: 3.48 K/UL

## 2021-09-29 ENCOUNTER — TRANSCRIPTION ENCOUNTER (OUTPATIENT)
Age: 71
End: 2021-09-29

## 2021-09-29 RX ORDER — ALFUZOSIN HYDROCHLORIDE 10 MG/1
0 TABLET, EXTENDED RELEASE ORAL
Qty: 0 | Refills: 0 | DISCHARGE

## 2021-09-29 NOTE — ASU PATIENT PROFILE, ADULT - NSICDXPASTMEDICALHX_GEN_ALL_CORE_FT
PAST MEDICAL HISTORY:  2019 novel coronavirus disease (COVID-19)     BPH (benign prostatic hyperplasia)     CAD (coronary artery disease) i coronary stent 2020    GERD (gastroesophageal reflux disease)     HLD (hyperlipidemia)

## 2021-09-29 NOTE — ASU PATIENT PROFILE, ADULT - NSICDXPASTSURGICALHX_GEN_ALL_CORE_FT
PAST SURGICAL HISTORY:  H/O cervical discectomy     H/O hemorrhoidectomy     H/O left wrist surgery     History of colon resection diverticulitis    History of hernia repair     History of thyroid surgery     S/P arthroscopy of right shoulder

## 2021-09-30 ENCOUNTER — OUTPATIENT (OUTPATIENT)
Dept: OUTPATIENT SERVICES | Facility: HOSPITAL | Age: 71
LOS: 1 days | Discharge: HOME | End: 2021-09-30
Payer: MEDICARE

## 2021-09-30 ENCOUNTER — APPOINTMENT (OUTPATIENT)
Dept: UROLOGY | Facility: HOSPITAL | Age: 71
End: 2021-09-30

## 2021-09-30 ENCOUNTER — RESULT REVIEW (OUTPATIENT)
Age: 71
End: 2021-09-30

## 2021-09-30 VITALS
WEIGHT: 189.38 LBS | DIASTOLIC BLOOD PRESSURE: 84 MMHG | RESPIRATION RATE: 16 BRPM | HEIGHT: 69 IN | TEMPERATURE: 98 F | SYSTOLIC BLOOD PRESSURE: 174 MMHG | HEART RATE: 66 BPM | OXYGEN SATURATION: 97 %

## 2021-09-30 DIAGNOSIS — Z98.890 OTHER SPECIFIED POSTPROCEDURAL STATES: Chronic | ICD-10-CM

## 2021-09-30 DIAGNOSIS — Z90.49 ACQUIRED ABSENCE OF OTHER SPECIFIED PARTS OF DIGESTIVE TRACT: Chronic | ICD-10-CM

## 2021-09-30 PROCEDURE — 52649 PROSTATE LASER ENUCLEATION: CPT | Mod: 22

## 2021-09-30 PROCEDURE — 88305 TISSUE EXAM BY PATHOLOGIST: CPT | Mod: 26

## 2021-09-30 RX ORDER — TIOTROPIUM BROMIDE 18 UG/1
1 CAPSULE ORAL; RESPIRATORY (INHALATION) DAILY
Refills: 0 | Status: DISCONTINUED | OUTPATIENT
Start: 2021-09-30 | End: 2021-10-01

## 2021-09-30 RX ORDER — LIDOCAINE 4 G/100G
1 CREAM TOPICAL
Refills: 0 | Status: DISCONTINUED | OUTPATIENT
Start: 2021-09-30 | End: 2021-10-01

## 2021-09-30 RX ORDER — ALPRAZOLAM 0.25 MG
0.25 TABLET ORAL DAILY
Refills: 0 | Status: DISCONTINUED | OUTPATIENT
Start: 2021-09-30 | End: 2021-10-01

## 2021-09-30 RX ORDER — SODIUM CHLORIDE 9 MG/ML
1000 INJECTION, SOLUTION INTRAVENOUS
Refills: 0 | Status: DISCONTINUED | OUTPATIENT
Start: 2021-09-30 | End: 2021-10-01

## 2021-09-30 RX ORDER — ESOMEPRAZOLE MAGNESIUM 40 MG/1
1 CAPSULE, DELAYED RELEASE ORAL
Qty: 0 | Refills: 0 | DISCHARGE

## 2021-09-30 RX ORDER — CEFAZOLIN SODIUM 1 G
1000 VIAL (EA) INJECTION EVERY 8 HOURS
Refills: 0 | Status: DISCONTINUED | OUTPATIENT
Start: 2021-09-30 | End: 2021-10-01

## 2021-09-30 RX ORDER — IPRATROPIUM/ALBUTEROL SULFATE 18-103MCG
0 AEROSOL WITH ADAPTER (GRAM) INHALATION
Qty: 0 | Refills: 0 | DISCHARGE

## 2021-09-30 RX ORDER — LEVOTHYROXINE SODIUM 125 MCG
137 TABLET ORAL DAILY
Refills: 0 | Status: DISCONTINUED | OUTPATIENT
Start: 2021-09-30 | End: 2021-10-01

## 2021-09-30 RX ORDER — ACETAMINOPHEN 500 MG
650 TABLET ORAL EVERY 6 HOURS
Refills: 0 | Status: DISCONTINUED | OUTPATIENT
Start: 2021-09-30 | End: 2021-10-01

## 2021-09-30 RX ORDER — ALBUTEROL 90 UG/1
2 AEROSOL, METERED ORAL EVERY 6 HOURS
Refills: 0 | Status: DISCONTINUED | OUTPATIENT
Start: 2021-09-30 | End: 2021-10-01

## 2021-09-30 RX ORDER — AMLODIPINE BESYLATE 2.5 MG/1
2.5 TABLET ORAL DAILY
Refills: 0 | Status: DISCONTINUED | OUTPATIENT
Start: 2021-09-30 | End: 2021-10-01

## 2021-09-30 RX ORDER — PANTOPRAZOLE SODIUM 20 MG/1
40 TABLET, DELAYED RELEASE ORAL
Refills: 0 | Status: DISCONTINUED | OUTPATIENT
Start: 2021-09-30 | End: 2021-10-01

## 2021-09-30 RX ORDER — FINASTERIDE 5 MG/1
5 TABLET, FILM COATED ORAL DAILY
Refills: 0 | Status: DISCONTINUED | OUTPATIENT
Start: 2021-09-30 | End: 2021-10-01

## 2021-09-30 RX ORDER — SACCHAROMYCES BOULARDII 250 MG
0 POWDER IN PACKET (EA) ORAL
Qty: 0 | Refills: 0 | DISCHARGE

## 2021-09-30 RX ORDER — ONDANSETRON 8 MG/1
4 TABLET, FILM COATED ORAL EVERY 6 HOURS
Refills: 0 | Status: DISCONTINUED | OUTPATIENT
Start: 2021-09-30 | End: 2021-10-01

## 2021-09-30 RX ORDER — ATORVASTATIN CALCIUM 80 MG/1
20 TABLET, FILM COATED ORAL AT BEDTIME
Refills: 0 | Status: DISCONTINUED | OUTPATIENT
Start: 2021-09-30 | End: 2021-10-01

## 2021-09-30 RX ORDER — SIMETHICONE 80 MG/1
80 TABLET, CHEWABLE ORAL THREE TIMES A DAY
Refills: 0 | Status: DISCONTINUED | OUTPATIENT
Start: 2021-09-30 | End: 2021-10-01

## 2021-09-30 RX ORDER — ALPRAZOLAM 0.25 MG
0.5 TABLET ORAL ONCE
Refills: 0 | Status: DISCONTINUED | OUTPATIENT
Start: 2021-09-30 | End: 2021-09-30

## 2021-09-30 RX ORDER — ALFUZOSIN HYDROCHLORIDE 10 MG/1
1 TABLET, EXTENDED RELEASE ORAL
Qty: 0 | Refills: 0 | DISCHARGE

## 2021-09-30 RX ORDER — VALACYCLOVIR 500 MG/1
0 TABLET, FILM COATED ORAL
Qty: 0 | Refills: 0 | DISCHARGE

## 2021-09-30 RX ORDER — CICLESONIDE 160 UG/1
1 AEROSOL, METERED RESPIRATORY (INHALATION)
Qty: 0 | Refills: 0 | DISCHARGE

## 2021-09-30 RX ORDER — FINASTERIDE 5 MG/1
1 TABLET, FILM COATED ORAL
Qty: 0 | Refills: 0 | DISCHARGE

## 2021-09-30 RX ORDER — ATORVASTATIN CALCIUM 80 MG/1
1 TABLET, FILM COATED ORAL
Qty: 0 | Refills: 0 | DISCHARGE

## 2021-09-30 RX ORDER — OXYBUTYNIN CHLORIDE 5 MG
5 TABLET ORAL EVERY 8 HOURS
Refills: 0 | Status: DISCONTINUED | OUTPATIENT
Start: 2021-09-30 | End: 2021-10-01

## 2021-09-30 RX ORDER — AMLODIPINE BESYLATE 2.5 MG/1
1 TABLET ORAL
Qty: 0 | Refills: 0 | DISCHARGE

## 2021-09-30 RX ORDER — LEVOTHYROXINE SODIUM 125 MCG
1 TABLET ORAL
Qty: 0 | Refills: 0 | DISCHARGE

## 2021-09-30 RX ORDER — ALPRAZOLAM 0.25 MG
0 TABLET ORAL
Qty: 0 | Refills: 0 | DISCHARGE

## 2021-09-30 RX ORDER — MIRABEGRON 50 MG/1
1 TABLET, EXTENDED RELEASE ORAL
Qty: 0 | Refills: 0 | DISCHARGE

## 2021-09-30 RX ORDER — LEVOCETIRIZINE DIHYDROCHLORIDE 0.5 MG/ML
1 SOLUTION ORAL
Qty: 0 | Refills: 0 | DISCHARGE

## 2021-09-30 RX ORDER — TAMSULOSIN HYDROCHLORIDE 0.4 MG/1
0.8 CAPSULE ORAL AT BEDTIME
Refills: 0 | Status: DISCONTINUED | OUTPATIENT
Start: 2021-09-30 | End: 2021-10-01

## 2021-09-30 RX ADMIN — TAMSULOSIN HYDROCHLORIDE 0.8 MILLIGRAM(S): 0.4 CAPSULE ORAL at 21:47

## 2021-09-30 RX ADMIN — Medication 100 MILLIGRAM(S): at 21:46

## 2021-09-30 RX ADMIN — AMLODIPINE BESYLATE 2.5 MILLIGRAM(S): 2.5 TABLET ORAL at 21:46

## 2021-09-30 RX ADMIN — ATORVASTATIN CALCIUM 20 MILLIGRAM(S): 80 TABLET, FILM COATED ORAL at 21:47

## 2021-09-30 RX ADMIN — Medication 650 MILLIGRAM(S): at 13:05

## 2021-09-30 RX ADMIN — Medication 650 MILLIGRAM(S): at 13:13

## 2021-09-30 RX ADMIN — ALBUTEROL 2 PUFF(S): 90 AEROSOL, METERED ORAL at 21:46

## 2021-09-30 RX ADMIN — Medication 0.5 MILLIGRAM(S): at 23:59

## 2021-09-30 RX ADMIN — SIMETHICONE 80 MILLIGRAM(S): 80 TABLET, CHEWABLE ORAL at 23:59

## 2021-10-01 ENCOUNTER — TRANSCRIPTION ENCOUNTER (OUTPATIENT)
Age: 71
End: 2021-10-01

## 2021-10-01 VITALS
TEMPERATURE: 98 F | HEART RATE: 64 BPM | DIASTOLIC BLOOD PRESSURE: 70 MMHG | OXYGEN SATURATION: 98 % | SYSTOLIC BLOOD PRESSURE: 128 MMHG | RESPIRATION RATE: 17 BRPM

## 2021-10-01 DIAGNOSIS — N40.0 BENIGN PROSTATIC HYPERPLASIA WITHOUT LOWER URINARY TRACT SYMPTOMS: ICD-10-CM

## 2021-10-01 PROBLEM — E78.5 HYPERLIPIDEMIA, UNSPECIFIED: Chronic | Status: ACTIVE | Noted: 2021-09-29

## 2021-10-01 PROBLEM — K21.9 GASTRO-ESOPHAGEAL REFLUX DISEASE WITHOUT ESOPHAGITIS: Chronic | Status: ACTIVE | Noted: 2021-09-29

## 2021-10-01 PROBLEM — I25.10 ATHEROSCLEROTIC HEART DISEASE OF NATIVE CORONARY ARTERY WITHOUT ANGINA PECTORIS: Chronic | Status: ACTIVE | Noted: 2021-09-29

## 2021-10-01 PROBLEM — U07.1 COVID-19: Chronic | Status: ACTIVE | Noted: 2021-09-29

## 2021-10-01 LAB
ANION GAP SERPL CALC-SCNC: 7 MMOL/L — SIGNIFICANT CHANGE UP (ref 5–17)
BUN SERPL-MCNC: 9 MG/DL — SIGNIFICANT CHANGE UP (ref 7–23)
CALCIUM SERPL-MCNC: 8.6 MG/DL — SIGNIFICANT CHANGE UP (ref 8.4–10.5)
CHLORIDE SERPL-SCNC: 101 MMOL/L — SIGNIFICANT CHANGE UP (ref 96–108)
CO2 SERPL-SCNC: 28 MMOL/L — SIGNIFICANT CHANGE UP (ref 22–31)
CREAT SERPL-MCNC: 0.8 MG/DL — SIGNIFICANT CHANGE UP (ref 0.5–1.3)
GLUCOSE SERPL-MCNC: 122 MG/DL — HIGH (ref 70–99)
HCT VFR BLD CALC: 42.7 % — SIGNIFICANT CHANGE UP (ref 39–50)
HCV AB S/CO SERPL IA: 0.04 S/CO — SIGNIFICANT CHANGE UP
HCV AB SERPL-IMP: SIGNIFICANT CHANGE UP
HGB BLD-MCNC: 14.1 G/DL — SIGNIFICANT CHANGE UP (ref 13–17)
MAGNESIUM SERPL-MCNC: 1.8 MG/DL — SIGNIFICANT CHANGE UP (ref 1.6–2.6)
MCHC RBC-ENTMCNC: 29.3 PG — SIGNIFICANT CHANGE UP (ref 27–34)
MCHC RBC-ENTMCNC: 33 GM/DL — SIGNIFICANT CHANGE UP (ref 32–36)
MCV RBC AUTO: 88.8 FL — SIGNIFICANT CHANGE UP (ref 80–100)
NRBC # BLD: 0 /100 WBCS — SIGNIFICANT CHANGE UP (ref 0–0)
PHOSPHATE SERPL-MCNC: 2.8 MG/DL — SIGNIFICANT CHANGE UP (ref 2.5–4.5)
PLATELET # BLD AUTO: 152 K/UL — SIGNIFICANT CHANGE UP (ref 150–400)
POTASSIUM SERPL-MCNC: 3.8 MMOL/L — SIGNIFICANT CHANGE UP (ref 3.5–5.3)
POTASSIUM SERPL-SCNC: 3.8 MMOL/L — SIGNIFICANT CHANGE UP (ref 3.5–5.3)
RBC # BLD: 4.81 M/UL — SIGNIFICANT CHANGE UP (ref 4.2–5.8)
RBC # FLD: 13.8 % — SIGNIFICANT CHANGE UP (ref 10.3–14.5)
SARS-COV-2 N GENE NPH QL NAA+PROBE: NOT DETECTED
SODIUM SERPL-SCNC: 136 MMOL/L — SIGNIFICANT CHANGE UP (ref 135–145)
WBC # BLD: 7.88 K/UL — SIGNIFICANT CHANGE UP (ref 3.8–10.5)
WBC # FLD AUTO: 7.88 K/UL — SIGNIFICANT CHANGE UP (ref 3.8–10.5)

## 2021-10-01 RX ORDER — LIDOCAINE 4 G/100G
1 CREAM TOPICAL
Qty: 0 | Refills: 0 | DISCHARGE
Start: 2021-10-01

## 2021-10-01 RX ORDER — MAGNESIUM SULFATE 500 MG/ML
1 VIAL (ML) INJECTION ONCE
Refills: 0 | Status: COMPLETED | OUTPATIENT
Start: 2021-10-01 | End: 2021-10-01

## 2021-10-01 RX ORDER — ASPIRIN/CALCIUM CARB/MAGNESIUM 324 MG
0 TABLET ORAL
Qty: 0 | Refills: 0 | DISCHARGE

## 2021-10-01 RX ORDER — POTASSIUM PHOSPHATE, MONOBASIC POTASSIUM PHOSPHATE, DIBASIC 236; 224 MG/ML; MG/ML
15 INJECTION, SOLUTION INTRAVENOUS ONCE
Refills: 0 | Status: COMPLETED | OUTPATIENT
Start: 2021-10-01 | End: 2021-10-01

## 2021-10-01 RX ORDER — TIOTROPIUM BROMIDE 18 UG/1
1 CAPSULE ORAL; RESPIRATORY (INHALATION)
Qty: 0 | Refills: 0 | DISCHARGE
Start: 2021-10-01

## 2021-10-01 RX ADMIN — POTASSIUM PHOSPHATE, MONOBASIC POTASSIUM PHOSPHATE, DIBASIC 62.5 MILLIMOLE(S): 236; 224 INJECTION, SOLUTION INTRAVENOUS at 12:03

## 2021-10-01 RX ADMIN — Medication 100 GRAM(S): at 10:51

## 2021-10-01 RX ADMIN — LIDOCAINE 1 APPLICATION(S): 4 CREAM TOPICAL at 05:47

## 2021-10-01 RX ADMIN — FINASTERIDE 5 MILLIGRAM(S): 5 TABLET, FILM COATED ORAL at 11:38

## 2021-10-01 RX ADMIN — PANTOPRAZOLE SODIUM 40 MILLIGRAM(S): 20 TABLET, DELAYED RELEASE ORAL at 06:11

## 2021-10-01 RX ADMIN — Medication 100 MILLIGRAM(S): at 06:11

## 2021-10-01 RX ADMIN — Medication 137 MICROGRAM(S): at 05:44

## 2021-10-01 RX ADMIN — SODIUM CHLORIDE 100 MILLILITER(S): 9 INJECTION, SOLUTION INTRAVENOUS at 10:51

## 2021-10-01 RX ADMIN — AMLODIPINE BESYLATE 2.5 MILLIGRAM(S): 2.5 TABLET ORAL at 06:11

## 2021-10-01 NOTE — DISCHARGE NOTE PROVIDER - NSDCFUADDINST_GEN_ALL_CORE_FT
Drink plenty of water to flush out the bladder.   To avoid constipation, take a stool softener as needed.   Blood in your urine. It's normal to see blood right after surgery. Urination might be painful, or you might have a sense of urgency or frequent need to urinate. This is normal. Please call the office or go to the ER if symptoms worsen.

## 2021-10-01 NOTE — PROGRESS NOTE ADULT - SUBJECTIVE AND OBJECTIVE BOX
POST OP NOTE:    Patient states has no complaints at this time, denies any nausea or vomiting, denies fever or chills, denies SOB or CP.       09-30-21 @ 07:01  -  09-30-21 @ 17:49  --------------------------------------------------------  IN: 6000 mL / OUT: 1750 mL / NET: 4250 mL      T(C): 36.7 (09-30-21 @ 15:31), Max: 36.7 (09-30-21 @ 15:31)  HR: 48 (09-30-21 @ 15:31) (46 - 66)  BP: 146/75 (09-30-21 @ 15:31) (129/62 - 174/84)  RR: 17 (09-30-21 @ 15:31) (9 - 19)  SpO2: 98% (09-30-21 @ 15:31) (95% - 98%)    GEN: NAD  ABD: Soft, ND, NT  :  lee draining clear on CBI        A/P 72 yo m with BPH s/p TURP  1) advance diet  2) cont IVF until tolerating PO  3) Continue CBI  4) OOB
INTERVAL HPI/OVERNIGHT EVENTS:  No acute events overnight.    VITALS:    T(F): 99 (09-30-21 @ 21:14), Max: 99 (09-30-21 @ 21:14)  HR: 56 (09-30-21 @ 21:14) (46 - 66)  BP: 165/74 (09-30-21 @ 21:14) (129/62 - 174/84)  RR: 17 (09-30-21 @ 21:14) (9 - 19)  SpO2: 96% (09-30-21 @ 21:14) (95% - 98%)  Wt(kg): --    I&O's Detail    30 Sep 2021 07:01  -  01 Oct 2021 05:14  --------------------------------------------------------  IN:    Continuous Bladder Irrigation (mL): 86472 mL  Total IN: 99528 mL    OUT:    Continuous Bladder Irrigation (mL): 08296 mL  Total OUT: 23350 mL    Total NET: -2000 mL          MEDICATIONS:    ANTIBIOTICS:  ceFAZolin   IVPB 1000 milliGRAM(s) IV Intermittent every 8 hours      PAIN CONTROL:  acetaminophen   Tablet .. 650 milliGRAM(s) Oral every 6 hours PRN  ALPRAZolam 0.25 milliGRAM(s) Oral daily PRN  ondansetron Injectable 4 milliGRAM(s) IV Push every 6 hours PRN       MEDS:  oxybutynin 5 milliGRAM(s) Oral every 8 hours PRN      HEME/ONC        PHYSICAL EXAM:  General: No acute distress.  Alert and Oriented  Abdominal Exam: soft, NT, ND   Exam: FC / CBI intact, urine clear      LABS:                RADIOLOGY & ADDITIONAL TESTS:

## 2021-10-01 NOTE — DISCHARGE NOTE PROVIDER - NSDCFUADDAPPT_GEN_ALL_CORE_FT
Please call Dr. Mclaughlin office for follow up in a week.  Please stop taking your Aspirin until Dr. Mclaughlin says it is okay.

## 2021-10-01 NOTE — DISCHARGE NOTE PROVIDER - NSDCCPCAREPLAN_GEN_ALL_CORE_FT
PRINCIPAL DISCHARGE DIAGNOSIS  Diagnosis: BPH (benign prostatic hyperplasia)  Assessment and Plan of Treatment:

## 2021-10-01 NOTE — DISCHARGE NOTE NURSING/CASE MANAGEMENT/SOCIAL WORK - PATIENT PORTAL LINK FT
You can access the FollowMyHealth Patient Portal offered by [facility] by registering at the following website: [email or web address]. By joining [facility]’s FollowMyHealth portal, you will also be able to view your health information using other applications (apps) compatible with our system.

## 2021-10-01 NOTE — DISCHARGE NOTE PROVIDER - CARE PROVIDER_API CALL
Tashia Mclaughlin)  Urology  [address] [address]  Phone: [phone]  Fax: [phone]  Follow Up Time: 1 week

## 2021-10-01 NOTE — DISCHARGE NOTE PROVIDER - HOSPITAL COURSE
MEDICINE Patient is a 71y old  Male who presents with a chief complaint of bph (01 Oct 2021 05:13)      HPI:  72 yo m with bph here for elective turp no new complaints since last visit to urology office (30 Sep 2021 18:11)    10/1: No acute overnight event. Patient VSS, HDS, and afebrile. He passed TOV w/ Multiple voids 300cc output 143cc PVR, 300cc output 166cc PVR, 375 output w/ 245cc PVR, and 200cc output w/ 81cc PVR.  He is cleared for discharge, he is not to take Aspirin for a week after surgery, and should follow up w/ Dr. Mclaughlin after 1 week.          Vital Signs Last 24 Hrs  T(C): 36.7 (01 Oct 2021 09:26), Max: 37.2 (30 Sep 2021 21:14)  T(F): 98 (01 Oct 2021 09:26), Max: 99 (30 Sep 2021 21:14)  HR: 64 (01 Oct 2021 09:26) (46 - 64)  BP: 128/70 (01 Oct 2021 09:26) (128/70 - 165/74)  BP(mean): 100 (30 Sep 2021 14:05) (97 - 109)  RR: 17 (01 Oct 2021 09:26) (9 - 19)  SpO2: 98% (01 Oct 2021 09:26) (96% - 98%)  I&O's Summary    30 Sep 2021 07:01  -  01 Oct 2021 07:00  --------------------------------------------------------  IN: 93535 mL / OUT: 51757 mL / NET: -3700 mL    01 Oct 2021 07:01  -  01 Oct 2021 12:49  --------------------------------------------------------  IN: 1620 mL / OUT: 1825 mL / NET: -205 mL      LABS:                        14.1   7.88  )-----------( 152      ( 01 Oct 2021 09:01 )             42.7     10-01    136  |  101  |  9   ----------------------------<  122<H>  3.8   |  28  |  0.80    Ca    8.6      01 Oct 2021 09:01  Phos  2.8     10-01  Mg     1.8     10-01

## 2021-10-01 NOTE — DISCHARGE NOTE PROVIDER - NSDCMRMEDTOKEN_GEN_ALL_CORE_FT
alfuzosin 10 mg oral tablet, extended release: 1 tab(s) orally once a day  Alvesco HFA 80 mcg/inh inhalation aerosol: 1 puff(s) inhaled 2 times a day  amLODIPine 2.5 mg oral tablet: 1 tab(s) orally once a day  atorvastatin 20 mg oral tablet: 1 tab(s) orally once a day  Combivent Respimat 20 mcg-100 mcg/inh inhalation aerosol: inhaled once a day  finasteride 5 mg oral tablet: 1 tab(s) orally once a day  Florastor 250 mg oral capsule: orally once a day  levothyroxine 137 mcg (0.137 mg) oral capsule: 1 cap(s) orally once a day  lidocaine 2% topical gel with applicator: 1 application topically 5 times a day, As needed, penile pain  Myrbetriq 25 mg oral tablet, extended release: 1 tab(s) orally once a day  NexIUM 40 mg oral delayed release capsule: 1 cap(s) orally once a day  tiotropium 18 mcg inhalation capsule: 1 cap(s) inhaled once a day  valACYclovir 1 g oral tablet: orally once a day  Xanax 0.25 mg oral tablet: orally once a day  Xyzal 5 mg oral tablet: 1 tab(s) orally once a day (in the evening)

## 2021-10-03 LAB
CULTURE RESULTS: NO GROWTH — SIGNIFICANT CHANGE UP
SPECIMEN SOURCE: SIGNIFICANT CHANGE UP

## 2021-10-12 PROCEDURE — 85027 COMPLETE CBC AUTOMATED: CPT

## 2021-10-12 PROCEDURE — 94640 AIRWAY INHALATION TREATMENT: CPT

## 2021-10-12 PROCEDURE — 52648 LASER SURGERY OF PROSTATE: CPT

## 2021-10-12 PROCEDURE — 86803 HEPATITIS C AB TEST: CPT

## 2021-10-12 PROCEDURE — C1889: CPT

## 2021-10-12 PROCEDURE — 36415 COLL VENOUS BLD VENIPUNCTURE: CPT

## 2021-10-12 PROCEDURE — 88305 TISSUE EXAM BY PATHOLOGIST: CPT

## 2021-10-12 PROCEDURE — 83735 ASSAY OF MAGNESIUM: CPT

## 2021-10-12 PROCEDURE — 84100 ASSAY OF PHOSPHORUS: CPT

## 2021-10-12 PROCEDURE — 87086 URINE CULTURE/COLONY COUNT: CPT

## 2021-10-12 PROCEDURE — 80048 BASIC METABOLIC PNL TOTAL CA: CPT

## 2021-10-13 ENCOUNTER — APPOINTMENT (OUTPATIENT)
Dept: UROLOGY | Facility: CLINIC | Age: 71
End: 2021-10-13
Payer: MEDICARE

## 2021-10-13 LAB — SURGICAL PATHOLOGY STUDY: SIGNIFICANT CHANGE UP

## 2021-10-13 PROCEDURE — 99024 POSTOP FOLLOW-UP VISIT: CPT

## 2021-10-13 PROCEDURE — 51798 US URINE CAPACITY MEASURE: CPT

## 2021-10-13 NOTE — ASSESSMENT
[FreeTextEntry1] : Assessment: Mr. RITO DIANE is a 71 year year old man with recurrent gross hematuria, hematospermia, severe bothersome LUTS despite alfuzosin, finasteride and mirabegron, markedly enlarged prostate on CT (170 cc) with thickened bladder and bilateral hydroureter. S/P GLEP 9/30/21. Pathology with 115 cc benign prostate tissue. Passed void trial POD 1. Voiding well with some persistent frequency, urgency, intermittent hematuria, generally strong stream, some incomplete emptying and minimal leakage. PVR 56 cc. \par  - Continue kegels\par  - F/U in 3 months. Will complete IPSS, PSA, PVR with urologist in [place].\par

## 2021-10-13 NOTE — HISTORY OF PRESENT ILLNESS
[FreeTextEntry1] : 71 year old man with history of sigmoidectomy, thyroidectomy, severe COVID-19 infection, long history of urinary symptoms on alfuzosin, finasteride and mirabegron with ongoing bothersome frequency, urgency, nocturia, incomplete emptying and weak stream. He has had recurrent gross hematuria and gross hematospermia as well. No recent fevers, chills, dysuria, flank pain. \par \par Cystoscopy 8/18/21: trilobar hypertrophy with trabeculated bladder and diverticulum\par \par Renal ultrasound 8/18/21: complex left lower pole cyst, no hydronephrosis\par \par CT 8/24/21: bilateral hydroureter, markedly enlarged prostate (170 cc), thickened bladder wall, bilateral simple renal cysts\par \par Uroflow 8/9/21: Qmax 13.3, voided volume 231, \par \par 9/15/21: Presents to discuss Laser enucleation of the prostate. No changes to health.\par \par IPSS 17, QOL 5, JET 15\par \par 9/30/21: GLEP, uncomplicated, passed void trial POD 1.\par \par Pathology with 115 grams benign tissue\par \par 10/13/21: Presents for follow up. Doing well. Hematuria intermittent. Voiding with strong stream. Feels occassional incomplete emptying and penile tingling. Minimal leakage, and mostly dry at this point. Still with some frequency and urgency that he feels is improving.\par \par PVR 56, uroflow completed with low voided volume (Qmax 15.7)\par \par

## 2021-10-13 NOTE — LETTER BODY
[Dear  ___] : Dear  [unfilled], [Please see my note below.] : Please see my note below. [Consult Closing:] : Thank you very much for allowing me to participate in the care of this patient.  If you have any questions, please do not hesitate to contact me. [Sincerely,] : Sincerely, [FreeTextEntry3] : Tashia Mclaughlin MD

## 2021-11-01 ENCOUNTER — APPOINTMENT (OUTPATIENT)
Dept: ORTHOPEDIC SURGERY | Facility: CLINIC | Age: 71
End: 2021-11-01
Payer: MEDICARE

## 2021-11-01 VITALS — RESPIRATION RATE: 16 BRPM | WEIGHT: 184 LBS | HEIGHT: 68 IN | BODY MASS INDEX: 27.89 KG/M2

## 2021-11-01 DIAGNOSIS — S69.80XA OTHER SPECIFIED INJURIES OF UNSPECIFIED WRIST, HAND AND FINGER(S), INITIAL ENCOUNTER: ICD-10-CM

## 2021-11-01 PROCEDURE — 99213 OFFICE O/P EST LOW 20 MIN: CPT | Mod: 25

## 2021-11-01 PROCEDURE — 73110 X-RAY EXAM OF WRIST: CPT | Mod: 50

## 2021-11-01 PROCEDURE — 20605 DRAIN/INJ JOINT/BURSA W/O US: CPT

## 2021-11-08 ENCOUNTER — TRANSCRIPTION ENCOUNTER (OUTPATIENT)
Age: 71
End: 2021-11-08

## 2021-11-09 ENCOUNTER — APPOINTMENT (OUTPATIENT)
Dept: UROLOGY | Facility: CLINIC | Age: 71
End: 2021-11-09
Payer: MEDICARE

## 2021-11-09 PROCEDURE — 99024 POSTOP FOLLOW-UP VISIT: CPT

## 2021-11-09 NOTE — REASON FOR VISIT
[Other Location: e.g. School (Enter Location, City,State)___] : at [unfilled], at the time of the visit. [Medical Office: ([facility])___] : at the medical office located in  [Verbal consent obtained from patient] : the patient, [unfilled] [Follow-up Visit ___] : a follow-up visit  for [unfilled]

## 2021-11-09 NOTE — ASSESSMENT
[FreeTextEntry1] : Assessment: Mr. RITO DIANE is a 71 year year old man with recurrent gross hematuria, hematospermia, severe bothersome LUTS despite alfuzosin, finasteride and mirabegron, markedly enlarged prostate on CT (170 cc) with thickened bladder and bilateral hydroureter. S/P GLEP 9/30/21. Pathology with 115 cc benign prostate tissue. Passed void trial POD 1. Voiding well with improving frequency, urgency, resolved hematuria and leakage. Seeing urologist in [place] this week for PVR. \par  - Continue kegels\par  - F/U when back from [place] for uroflow, IPSS, PVR, PSA\par

## 2021-11-09 NOTE — HISTORY OF PRESENT ILLNESS
[FreeTextEntry1] : 71 year old man with history of sigmoidectomy, thyroidectomy, severe COVID-19 infection, long history of urinary symptoms on alfuzosin, finasteride and mirabegron with ongoing bothersome frequency, urgency, nocturia, incomplete emptying and weak stream. He has had recurrent gross hematuria and gross hematospermia as well. No recent fevers, chills, dysuria, flank pain. \par \par Cystoscopy 8/18/21: trilobar hypertrophy with trabeculated bladder and diverticulum\par \par Renal ultrasound 8/18/21: complex left lower pole cyst, no hydronephrosis\par \par CT 8/24/21: bilateral hydroureter, markedly enlarged prostate (170 cc), thickened bladder wall, bilateral simple renal cysts\par \par Uroflow 8/9/21: Qmax 13.3, voided volume 231, \par \par 9/15/21: Presents to discuss Laser enucleation of the prostate. No changes to health.\par \par IPSS 17, QOL 5, JET 15\par \par 9/30/21: GLEP, uncomplicated, passed void trial POD 1.\par \par Pathology with 115 grams benign tissue\par \par 10/13/21: Presents for follow up. Doing well. Hematuria intermittent. Voiding with strong stream. Feels occassional incomplete emptying and penile tingling. Minimal leakage, and mostly dry at this point. Still with some frequency and urgency that he feels is improving.\par \par PVR 56, uroflow completed with low voided volume (Qmax 15.7)\par \par 11/9/21: Telehealth follow up. Patient in [place], scheduled to see urologist this week. Doing well. Strong stream, complete emptying, frequency and urgency improving, nocturia improving, no more leakage, no more hematuria. \par \par

## 2021-11-16 ENCOUNTER — NON-APPOINTMENT (OUTPATIENT)
Age: 71
End: 2021-11-16

## 2021-11-22 ENCOUNTER — TRANSCRIPTION ENCOUNTER (OUTPATIENT)
Age: 71
End: 2021-11-22

## 2021-12-19 ENCOUNTER — LABORATORY RESULT (OUTPATIENT)
Age: 71
End: 2021-12-19

## 2021-12-21 ENCOUNTER — NON-APPOINTMENT (OUTPATIENT)
Age: 71
End: 2021-12-21

## 2021-12-28 ENCOUNTER — APPOINTMENT (OUTPATIENT)
Dept: UROLOGY | Facility: CLINIC | Age: 71
End: 2021-12-28
Payer: MEDICARE

## 2021-12-28 DIAGNOSIS — R35.1 NOCTURIA: ICD-10-CM

## 2021-12-28 PROCEDURE — 99024 POSTOP FOLLOW-UP VISIT: CPT

## 2021-12-28 RX ORDER — MIRABEGRON 50 MG/1
50 TABLET, FILM COATED, EXTENDED RELEASE ORAL
Qty: 90 | Refills: 3 | Status: ACTIVE | OUTPATIENT
Start: 2018-10-04

## 2021-12-28 NOTE — HISTORY OF PRESENT ILLNESS
[FreeTextEntry1] : 71 year old man with history of sigmoidectomy, thyroidectomy, severe COVID-19 infection, long history of urinary symptoms on alfuzosin, finasteride and mirabegron with ongoing bothersome frequency, urgency, nocturia, incomplete emptying and weak stream. He has had recurrent gross hematuria and gross hematospermia as well. No recent fevers, chills, dysuria, flank pain. \par \par Cystoscopy 8/18/21: trilobar hypertrophy with trabeculated bladder and diverticulum\par \par Renal ultrasound 8/18/21: complex left lower pole cyst, no hydronephrosis\par \par CT 8/24/21: bilateral hydroureter, markedly enlarged prostate (170 cc), thickened bladder wall, bilateral simple renal cysts\par \par Uroflow 8/9/21: Qmax 13.3, voided volume 231, \par \par 9/15/21: Presents to discuss Laser enucleation of the prostate. No changes to health.\par \par IPSS 17, QOL 5, JET 15\par \par 9/30/21: GLEP, uncomplicated, passed void trial POD 1.\par \par Pathology with 115 grams benign tissue\par \par 10/13/21: Presents for follow up. Doing well. Hematuria intermittent. Voiding with strong stream. Feels occassional incomplete emptying and penile tingling. Minimal leakage, and mostly dry at this point. Still with some frequency and urgency that he feels is improving.\par \par PVR 56, uroflow completed with low voided volume (Qmax 15.7)\par \par 11/9/21: Telehealth follow up. Patient in [place], scheduled to see urologist this week. Doing well. Strong stream, complete emptying, frequency and urgency improving, nocturia improving, no more leakage, no more hematuria. \par \par 12/28/21: Telehealth follow up. Patient in [place], currently has Covid-19. Doing well from urinary perspective. Strong stream, complete emptying, frequency and urgency persistent though improving gradually, nocturia improving down to twice at night from 5 times per night. No leakage, no hematuria. Taking mirabegron 25 mg QD and finasteride 1 mg. Erections are strong, but does report decreased libido.\par \par

## 2022-04-11 PROBLEM — R37 SEXUAL DYSFUNCTION: Status: ACTIVE | Noted: 2017-05-26

## 2022-10-12 ENCOUNTER — APPOINTMENT (OUTPATIENT)
Dept: UROLOGY | Facility: CLINIC | Age: 72
End: 2022-10-12

## 2022-10-25 ENCOUNTER — APPOINTMENT (OUTPATIENT)
Dept: UROLOGY | Facility: CLINIC | Age: 72
End: 2022-10-25

## 2022-10-25 ENCOUNTER — LABORATORY RESULT (OUTPATIENT)
Age: 72
End: 2022-10-25

## 2022-10-25 ENCOUNTER — NON-APPOINTMENT (OUTPATIENT)
Age: 72
End: 2022-10-25

## 2022-10-25 VITALS — DIASTOLIC BLOOD PRESSURE: 57 MMHG | TEMPERATURE: 98.3 F | HEART RATE: 64 BPM | SYSTOLIC BLOOD PRESSURE: 137 MMHG

## 2022-10-25 DIAGNOSIS — Z87.898 PERSONAL HISTORY OF OTHER SPECIFIED CONDITIONS: ICD-10-CM

## 2022-10-25 DIAGNOSIS — R68.82 DECREASED LIBIDO: ICD-10-CM

## 2022-10-25 DIAGNOSIS — R39.9 UNSPECIFIED SYMPTOMS AND SIGNS INVOLVING THE GENITOURINARY SYSTEM: ICD-10-CM

## 2022-10-25 PROCEDURE — 99214 OFFICE O/P EST MOD 30 MIN: CPT

## 2022-10-25 PROCEDURE — 51741 ELECTRO-UROFLOWMETRY FIRST: CPT

## 2022-10-25 PROCEDURE — 51798 US URINE CAPACITY MEASURE: CPT

## 2022-10-25 NOTE — ASSESSMENT
[FreeTextEntry1] : 69 year old with long history of urinary symptoms\par On Uroxatral, finasteride and Myrbetric\par Primary concern is unpredictable nature of symptoms\par When playing golf and occupied does not experience frequency\par DIscussed impact of fluid intake, ETOH etc on variable symptoms.\par Nocturia improved.\par Discussed attempting to increase Myrbetric.  Patient thinks he had previously attempted and did not tolerate.\par Does not want to attempt again\par \par Discussed recurrent hematuria since starting Brilinta\par Sounds like initial hematuria not total but persists for several days.  \par DIscussed fact that since this is occurring in spite of finasteride the need for evaluation with cystoscopy\par Referred to Dr Brenner or Ronnie.\par Emphasized the need for evaluation\par Patient reluctant and emphasized importance\par Patient agrees to proceed.\par \par \par Discussed asymptomatic epididymal cyst.\par \par BPH on prior biopsy\par PSA reduced on finasteride appropriately\par referred to [facility] Truj[facility] ; Kanu Brenner\par \par discussed need for cystoscopy\par Discussed candidal intertriginous rash\par Will treat with nystatin\par \par \par 8.6.2020\par discussed urinary symptoms\par patient feels improved after cipro\par discussed negative exam , urinalysis and PSA at that time not consistent with prostatitis\par discussed avoiding unnecessary antibiotics\par no evidence for further antibiotics\par continues to have mild GI \par f/u [facility]\par continues on:\par 1. alfuzosin \par 2. myrbetriq\par 3. finasteride\par \par \par 9.22.2020\par patient presents with approximately on week of blood per urethra with initial hematuria\par His urine is clear\par Started when changed from Eliquis  to Plavix\par At same time had ? mild trauma to urethra over edge of traveling urinal  \par no pain; decreasing and not with each urination\par urinalysis rbc with wbc; occ bacteria\par No evidence of infection on exam \par discussed ? minor trauma on antiplatelet and anticoagulant therapy\par avoid exercise etc\par will not treat as if infection because of above and recent Ecoli GI infection\par if persists will proceed with repeat cystoscopy\par \par flow noted/reviewed\par \par 8.9.2021\par voided volume 231cc\par max flow 13.3\par \par \par urine and culture negative\par no evidence of infection\par (patient felt he had UTI and wanted antibiotic)\par \par patient presents with significant urinary symptoms \par His flow is 13.3 cc .sec with increased .\par His quality of life is adversely effected with inability to travel etc.\par Options I discussed with RITO DIANE the possible indications for treatment of prostatic obstruction including:\par 1 urinary symptoms and quality of life issues\par 2.  Obstruction and urinary retention\par 3.  Infections\par 4.  Bladder stones\par 5.  Upper tract changes including hydronephrosis and renal dysfunction\par We discussed the indications in his case.\par \par We had an extensive discussion about treatment options\par We discussed surgical and minimally invasive approaches  UROLIFT and Rezume\par Surgical procedures discussed included transurethral resection of the prostate, open prostatectomy and robot assisted procedures.\par we discussed risks and complications of surgical procedures including:\par 1 hematuria potential requiring transfusion \par 2.  Urinary incontinence\par 3.  Retrograde ejaculation and sexual dysfunction\par 4.  Inability to urinate after the procedure\par 5.  Risks of surgery and anesthesia including but not limited to death\par \par We discussed medical management with alpha blocking agents (alfuzosin, tamsulosin doxazosin etc.)\par We discussed that he is on maximal medical intervention\par We discussed surgical intervention TURP vs minimally invasive procedures advantages and disadvantages\par \par discussed proceeding with :\par 1. renal usg (mother had RCC)\par 2. cystoscopy re LUTS and blood per urethra (Blood per urethra seems to always follow ejaculation\par 3. PSA \par \par \par 8.18.2021\par #1 renal ultrasound today does not demonstrate any hydronephrosis.  It does demonstrate a left lower pole cyst.  Is septated.  It is thick-walled.  We discussed this.  We decided to proceed with a CT study.  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  There are no tumors prostatic urethra is obstructing without other abnormality.  Discussed at length the various surgical interventions for prostate outlet obstruction including; 1.  TURP, 2.  UroLift, 3.  Rezum\par We discussed indications for intervention including his marked urinary symptoms.  Patient is unwilling to proceed with any intervention at this time.\par \par He also complains of persistent hematospermia as well as some hematuria.  As result of this he will undergo CT urogram as discussed above.  He will also undergone transrectal ultrasound evaluation of his prostate and seminal vesicles.\par \par 10.25.2022\par Patient returns after having undergone a holmium laser enucleation of the prostate by Dr. Alberto.  He continues to have urinary frequency.  He does his urinary stream is markedly improved.  He continues to have some urgency but this is also improved.  He continues on Myrbetriq.  He is not taking alfuzosin.  He continues to take finasteride 1 mg for hair loss.\par \par He complains of a loss of libido.  He states he and his wife rarely have sexual intercourse.  He is bothered by this but he has not pursued this.  It does not seem to be a source of conflict.  We discussed sexual function and options.  However, his primary complaint is loss of libido.  His testosterone was previously noted to be normal.  We did discuss factors relating to libido.\par \par Plan:\par 1.  PSA\par 2.  Testosterone free testosterone\par 3.  Urinalysis urine culture\par 4. discussed couples/sex therapy\par \par

## 2022-10-25 NOTE — PHYSICAL EXAM
[General Appearance - Well Developed] : well developed [General Appearance - Well Nourished] : well nourished [Normal Appearance] : normal appearance [Well Groomed] : well groomed [General Appearance - In No Acute Distress] : no acute distress [Oriented To Time, Place, And Person] : oriented to person, place, and time [Affect] : the affect was normal [Mood] : the mood was normal [Not Anxious] : not anxious [Abdomen Soft] : soft [Abdomen Tenderness] : non-tender [Abdomen Hernia] : no hernia was discovered [Costovertebral Angle Tenderness] : no ~M costovertebral angle tenderness [Urethral Meatus] : meatus normal [Penis Abnormality] : normal circumcised penis [Epididymis] : the epididymides were normal [Testes Tenderness] : no tenderness of the testes [No Prostate Nodules] : no prostate nodules [Prostate Size ___ (0-4)] : prostate size [unfilled] (scale: 0-4) [FreeTextEntry1] : flow 12.3; voided 45.3; PVR 9

## 2022-10-25 NOTE — HISTORY OF PRESENT ILLNESS
[Urinary Frequency] : urinary frequency [FreeTextEntry1] : Patient has had long history of urinary frequency treated with Uroxatral.\par Started on VESIcare recently\par No clear improvement in urinary frequency \par No clear improvement in nocturia.\par Anxious about driving and symptoms. \par \par Patient is currently experiencing urinary urgency, but no urinary incontinence and no urinary retention \par The patient presents with complaints of urinary frequency (variable somewhat better on VESIcare). \par The patient presents with complaints of 2 episodes of nocturia. \par \par 3.12.2018\par on Uroxatral and Myrbetric\par improved urinary symptoms\par symptoms worse when early in the morning\par less of problem in PM\par worse when drinking diet\par \par 8.28.2018\par on Myrbetric \par on Uroxatral\par variable response to medications\par \par  \par 9.17.2018\par this weekend \par increased urinary symptoms\par feeling of incomplete emptying\par no dysuria\par masturbation weekly\par \par  \par 8.23.2019\par \par 5.21.2020\par urinary symptom\par on ASA Brilinta\par s/p angioplasty\par s/p thyroid cancer surgery\par \par 8.6.2020\par \par patient seen for diarrhea\par found to have Ecolid \par had covid test\par treated with Cipro resolved diarrhea\par The patient-doctor relationship has been established in a face to face fashion via real time video/audio HIPAA compliant communication using telemedicine software.  The patient's identity has been confirmed.  The patient was previously emailed a copy of the telemedicine consent.  They have had a chance to review and has now given verbal consent and has requested care to be assessed and treated through telemedicine and understands there maybe limitations in this process and they may need further follow up care in the office and or hospital settings.   \par THE FOLLOWING WAS READ TO AND CONSENTED TO BY PATIENT\par By virtue of my participation in this telehealth visit, I am consenting to receive care through telehealth. Telehealth is the use of electronic information and communication technologies by providers to deliver health care to patients at a distance. \par I understand that any care provided to me through [employer]’s telehealth application (“the [employer] candy”) will incorporate security protocols to protect the privacy and security of my health information. If any other application is used to provide care to me, I understand that the technology may not contain appropriate security protocols to protect the privacy and security of my health information. My provider has explained to me the risks associated with the technology platforms that he or she is using to provide care to me. I acknowledge that there are potential risks associated with any technology used while obtaining care through telehealth, including, but not limited to, connectively interruptions, other technical difficulties, and unauthorized access by a third party to one’s health information. Despite these risks, I agree to participate in the telehealth encounter. \par I understand and agree that I or my healthcare provider may terminate a telehealth encounter at any time in the event of a technical malfunction. \par I also understand that my location determines where medicine is being practiced. As a result, I will inform my provider where I am located at the time of my telehealth visit. \par I understand that there may be costs associated with a telehealth visit. I agree that I am responsible for any fees associated with the telehealth services that I receive. \par This Informed Consent for Telehealth Services During a Public Health Emergency will remain in effect solely during the term of the public health emergency.\par \par Verbal consent given on Aug 06, 2020   4.40PM by Everett Sierra from Mr. RITO DIANE \par \par urinary symptoms seem improved after Cipro\par no dysuria\par no initial stinging\par urgency improved\par  \par 9.22.2020\par patient presents with approximately on week of blood per urethra with initial hematuria\par His urine is clear\par Started when changed from Eliquis  to Plavix\par At same time had ? mild trauma to urethra over edge of traveling urinal\par \par 8.9.2021\par complaining of initial hematuria\par continues to complain of urgency and frequency\par not taling Plavix or eliquis\par hematospermia\par initial hematuria is always associated with prior hematospermia\par continues on :\par 1. alfuzosin\par 2. myrbetrc\par 3. finasteride\par \par 10.25.2022\par returns afte [facility] with Dr Mclaughlin\par frequency improved\par stream markedly improved\par variable nocturia\par able to have erection; able to masturbate; not interested in pursuing [Weak Stream] : no weak stream

## 2022-10-26 ENCOUNTER — APPOINTMENT (OUTPATIENT)
Dept: ORTHOPEDIC SURGERY | Facility: CLINIC | Age: 72
End: 2022-10-26

## 2022-10-26 ENCOUNTER — TRANSCRIPTION ENCOUNTER (OUTPATIENT)
Age: 72
End: 2022-10-26

## 2022-10-26 VITALS — RESPIRATION RATE: 16 BRPM | HEIGHT: 68 IN | WEIGHT: 184 LBS | BODY MASS INDEX: 27.89 KG/M2

## 2022-10-26 DIAGNOSIS — M25.532 PAIN IN LEFT WRIST: ICD-10-CM

## 2022-10-26 DIAGNOSIS — M25.531 PAIN IN RIGHT WRIST: ICD-10-CM

## 2022-10-26 DIAGNOSIS — M19.031 PRIMARY OSTEOARTHRITIS, RIGHT WRIST: ICD-10-CM

## 2022-10-26 DIAGNOSIS — G89.29 PAIN IN RIGHT WRIST: ICD-10-CM

## 2022-10-26 PROCEDURE — 73110 X-RAY EXAM OF WRIST: CPT | Mod: 50

## 2022-10-26 PROCEDURE — 20605 DRAIN/INJ JOINT/BURSA W/O US: CPT | Mod: RT

## 2022-10-26 PROCEDURE — 99214 OFFICE O/P EST MOD 30 MIN: CPT | Mod: 25

## 2022-10-28 LAB
TESTOST FREE SERPL-MCNC: 4.2 PG/ML
TESTOST SERPL-MCNC: 529 NG/DL

## 2022-11-01 ENCOUNTER — APPOINTMENT (OUTPATIENT)
Dept: UROLOGY | Facility: CLINIC | Age: 72
End: 2022-11-01

## 2023-03-29 DIAGNOSIS — N28.1 CYST OF KIDNEY, ACQUIRED: ICD-10-CM

## 2023-04-03 ENCOUNTER — NON-APPOINTMENT (OUTPATIENT)
Age: 73
End: 2023-04-03

## 2023-10-03 ENCOUNTER — APPOINTMENT (OUTPATIENT)
Dept: ORTHOPEDIC SURGERY | Facility: CLINIC | Age: 73
End: 2023-10-03
Payer: MEDICARE

## 2023-10-03 VITALS — WEIGHT: 184 LBS | RESPIRATION RATE: 16 BRPM | HEIGHT: 68 IN | BODY MASS INDEX: 27.89 KG/M2

## 2023-10-03 DIAGNOSIS — M25.531 PAIN IN RIGHT WRIST: ICD-10-CM

## 2023-10-03 PROCEDURE — 73110 X-RAY EXAM OF WRIST: CPT | Mod: RT

## 2023-10-03 PROCEDURE — 20605 DRAIN/INJ JOINT/BURSA W/O US: CPT | Mod: RT

## 2023-10-19 ENCOUNTER — APPOINTMENT (OUTPATIENT)
Dept: UROLOGY | Facility: CLINIC | Age: 73
End: 2023-10-19
Payer: MEDICARE

## 2023-10-19 VITALS
TEMPERATURE: 97.16 F | BODY MASS INDEX: 28.04 KG/M2 | HEART RATE: 62 BPM | SYSTOLIC BLOOD PRESSURE: 155 MMHG | DIASTOLIC BLOOD PRESSURE: 84 MMHG | WEIGHT: 185 LBS | OXYGEN SATURATION: 98 % | HEIGHT: 68 IN

## 2023-10-19 DIAGNOSIS — Z12.5 ENCOUNTER FOR SCREENING FOR MALIGNANT NEOPLASM OF PROSTATE: ICD-10-CM

## 2023-10-19 DIAGNOSIS — R03.0 ELEVATED BLOOD-PRESSURE READING, W/OUT DIAGNOSIS OF HYPERTENSION: ICD-10-CM

## 2023-10-19 DIAGNOSIS — R35.0 FREQUENCY OF MICTURITION: ICD-10-CM

## 2023-10-19 PROCEDURE — 51798 US URINE CAPACITY MEASURE: CPT

## 2023-10-19 PROCEDURE — 99214 OFFICE O/P EST MOD 30 MIN: CPT

## 2023-10-19 RX ORDER — MIRABEGRON 50 MG/1
50 TABLET, FILM COATED, EXTENDED RELEASE ORAL
Qty: 90 | Refills: 0 | Status: ACTIVE | OUTPATIENT
Start: 2023-10-19

## 2023-10-19 RX ORDER — VIBEGRON 75 MG/1
75 TABLET, FILM COATED ORAL
Qty: 90 | Refills: 0 | Status: ACTIVE | OUTPATIENT
Start: 2023-10-19

## 2023-10-19 RX ORDER — FINASTERIDE 5 MG/1
5 TABLET, FILM COATED ORAL DAILY
Qty: 90 | Refills: 0 | Status: ACTIVE | OUTPATIENT
Start: 2023-10-19

## 2023-10-20 LAB
ANION GAP SERPL CALC-SCNC: 11 MMOL/L
APPEARANCE: CLEAR
BACTERIA: NEGATIVE /HPF
BILIRUBIN URINE: NEGATIVE
BLOOD URINE: NEGATIVE
BUN SERPL-MCNC: 14 MG/DL
CALCIUM SERPL-MCNC: 8.8 MG/DL
CAST: 0 /LPF
CHLORIDE SERPL-SCNC: 102 MMOL/L
CO2 SERPL-SCNC: 27 MMOL/L
COLOR: YELLOW
CREAT SERPL-MCNC: 0.73 MG/DL
EGFR: 96 ML/MIN/1.73M2
EPITHELIAL CELLS: 0 /HPF
GLUCOSE QUALITATIVE U: NEGATIVE MG/DL
GLUCOSE SERPL-MCNC: 113 MG/DL
KETONES URINE: NEGATIVE MG/DL
LEUKOCYTE ESTERASE URINE: NEGATIVE
MICROSCOPIC-UA: NORMAL
NITRITE URINE: NEGATIVE
PH URINE: 6
POTASSIUM SERPL-SCNC: 4.7 MMOL/L
PROTEIN URINE: NEGATIVE MG/DL
PSA SERPL-MCNC: 0.05 NG/ML
RED BLOOD CELLS URINE: 1 /HPF
SODIUM SERPL-SCNC: 141 MMOL/L
SPECIFIC GRAVITY URINE: 1.02
UROBILINOGEN URINE: 0.2 MG/DL
WHITE BLOOD CELLS URINE: 0 /HPF

## 2023-10-28 RX ORDER — VIBEGRON 75 MG/1
75 TABLET, FILM COATED ORAL
Qty: 30 | Refills: 0 | Status: ACTIVE | COMMUNITY
Start: 2023-10-28 | End: 1900-01-01

## 2023-11-10 ENCOUNTER — NON-APPOINTMENT (OUTPATIENT)
Age: 73
End: 2023-11-10

## 2023-11-13 ENCOUNTER — NON-APPOINTMENT (OUTPATIENT)
Age: 73
End: 2023-11-13

## 2024-04-15 ENCOUNTER — TRANSCRIPTION ENCOUNTER (OUTPATIENT)
Age: 74
End: 2024-04-15

## 2024-04-15 RX ORDER — VALACYCLOVIR 500 MG/1
500 TABLET, FILM COATED ORAL DAILY
Qty: 90 | Refills: 0 | Status: ACTIVE | COMMUNITY
Start: 2024-04-15 | End: 1900-01-01

## 2024-07-12 ENCOUNTER — RX RENEWAL (OUTPATIENT)
Age: 74
End: 2024-07-12

## 2025-08-12 ENCOUNTER — EMERGENCY (EMERGENCY)
Facility: HOSPITAL | Age: 75
LOS: 1 days | End: 2025-08-12
Attending: EMERGENCY MEDICINE | Admitting: EMERGENCY MEDICINE
Payer: MEDICARE

## 2025-08-12 VITALS
OXYGEN SATURATION: 99 % | DIASTOLIC BLOOD PRESSURE: 85 MMHG | SYSTOLIC BLOOD PRESSURE: 168 MMHG | WEIGHT: 177.03 LBS | RESPIRATION RATE: 16 BRPM | TEMPERATURE: 98 F | HEART RATE: 52 BPM

## 2025-08-12 VITALS
DIASTOLIC BLOOD PRESSURE: 78 MMHG | HEART RATE: 54 BPM | RESPIRATION RATE: 16 BRPM | OXYGEN SATURATION: 98 % | SYSTOLIC BLOOD PRESSURE: 166 MMHG

## 2025-08-12 DIAGNOSIS — R00.2 PALPITATIONS: ICD-10-CM

## 2025-08-12 DIAGNOSIS — Z98.890 OTHER SPECIFIED POSTPROCEDURAL STATES: Chronic | ICD-10-CM

## 2025-08-12 DIAGNOSIS — Z95.5 PRESENCE OF CORONARY ANGIOPLASTY IMPLANT AND GRAFT: ICD-10-CM

## 2025-08-12 DIAGNOSIS — Z79.890 HORMONE REPLACEMENT THERAPY: ICD-10-CM

## 2025-08-12 DIAGNOSIS — Z88.5 ALLERGY STATUS TO NARCOTIC AGENT: ICD-10-CM

## 2025-08-12 DIAGNOSIS — E89.0 POSTPROCEDURAL HYPOTHYROIDISM: ICD-10-CM

## 2025-08-12 DIAGNOSIS — I25.10 ATHEROSCLEROTIC HEART DISEASE OF NATIVE CORONARY ARTERY WITHOUT ANGINA PECTORIS: ICD-10-CM

## 2025-08-12 DIAGNOSIS — Z88.8 ALLERGY STATUS TO OTHER DRUGS, MEDICAMENTS AND BIOLOGICAL SUBSTANCES: ICD-10-CM

## 2025-08-12 DIAGNOSIS — Z85.850 PERSONAL HISTORY OF MALIGNANT NEOPLASM OF THYROID: ICD-10-CM

## 2025-08-12 DIAGNOSIS — Z90.49 ACQUIRED ABSENCE OF OTHER SPECIFIED PARTS OF DIGESTIVE TRACT: Chronic | ICD-10-CM

## 2025-08-12 LAB
ALBUMIN SERPL ELPH-MCNC: 3.9 G/DL — SIGNIFICANT CHANGE UP (ref 3.4–5)
ALP SERPL-CCNC: 77 U/L — SIGNIFICANT CHANGE UP (ref 40–120)
ALT FLD-CCNC: 38 U/L — SIGNIFICANT CHANGE UP (ref 12–42)
ANION GAP SERPL CALC-SCNC: 6 MMOL/L — LOW (ref 9–16)
AST SERPL-CCNC: 26 U/L — SIGNIFICANT CHANGE UP (ref 15–37)
BASOPHILS # BLD AUTO: 0 K/UL — SIGNIFICANT CHANGE UP (ref 0–0.2)
BASOPHILS NFR BLD AUTO: 0 % — SIGNIFICANT CHANGE UP (ref 0–2)
BILIRUB SERPL-MCNC: 0.6 MG/DL — SIGNIFICANT CHANGE UP (ref 0.2–1.2)
BUN SERPL-MCNC: 16 MG/DL — SIGNIFICANT CHANGE UP (ref 7–23)
CALCIUM SERPL-MCNC: 8.5 MG/DL — SIGNIFICANT CHANGE UP (ref 8.5–10.5)
CHLORIDE SERPL-SCNC: 102 MMOL/L — SIGNIFICANT CHANGE UP (ref 96–108)
CO2 SERPL-SCNC: 31 MMOL/L — SIGNIFICANT CHANGE UP (ref 22–31)
CREAT SERPL-MCNC: 0.7 MG/DL — SIGNIFICANT CHANGE UP (ref 0.5–1.3)
EGFR: 96 ML/MIN/1.73M2 — SIGNIFICANT CHANGE UP
EGFR: 96 ML/MIN/1.73M2 — SIGNIFICANT CHANGE UP
EOSINOPHIL # BLD AUTO: 0.01 K/UL — SIGNIFICANT CHANGE UP (ref 0–0.5)
EOSINOPHIL NFR BLD AUTO: 0.4 % — SIGNIFICANT CHANGE UP (ref 0–6)
GLUCOSE SERPL-MCNC: 103 MG/DL — HIGH (ref 70–99)
HCT VFR BLD CALC: 40.3 % — SIGNIFICANT CHANGE UP (ref 39–50)
HGB BLD-MCNC: 13.8 G/DL — SIGNIFICANT CHANGE UP (ref 13–17)
IMM GRANULOCYTES # BLD AUTO: 0.01 K/UL — SIGNIFICANT CHANGE UP (ref 0–0.07)
IMM GRANULOCYTES NFR BLD AUTO: 0.4 % — SIGNIFICANT CHANGE UP (ref 0–0.9)
LYMPHOCYTES # BLD AUTO: 1.42 K/UL — SIGNIFICANT CHANGE UP (ref 1–3.3)
LYMPHOCYTES NFR BLD AUTO: 52.2 % — HIGH (ref 13–44)
MCHC RBC-ENTMCNC: 32.6 PG — SIGNIFICANT CHANGE UP (ref 27–34)
MCHC RBC-ENTMCNC: 34.2 G/DL — SIGNIFICANT CHANGE UP (ref 32–36)
MCV RBC AUTO: 95.3 FL — SIGNIFICANT CHANGE UP (ref 80–100)
MONOCYTES # BLD AUTO: 0.69 K/UL — SIGNIFICANT CHANGE UP (ref 0–0.9)
MONOCYTES NFR BLD AUTO: 25.4 % — HIGH (ref 2–14)
NEUTROPHILS # BLD AUTO: 0.59 K/UL — LOW (ref 1.8–7.4)
NEUTROPHILS NFR BLD AUTO: 21.6 % — LOW (ref 43–77)
NRBC # BLD AUTO: 0 K/UL — SIGNIFICANT CHANGE UP (ref 0–0)
NRBC # FLD: 0 K/UL — SIGNIFICANT CHANGE UP (ref 0–0)
NRBC BLD AUTO-RTO: 0 /100 WBCS — SIGNIFICANT CHANGE UP (ref 0–0)
PLATELET # BLD AUTO: 170 K/UL — SIGNIFICANT CHANGE UP (ref 150–400)
PMV BLD: 11.1 FL — SIGNIFICANT CHANGE UP (ref 7–13)
POTASSIUM SERPL-MCNC: 3.9 MMOL/L — SIGNIFICANT CHANGE UP (ref 3.5–5.3)
POTASSIUM SERPL-SCNC: 3.9 MMOL/L — SIGNIFICANT CHANGE UP (ref 3.5–5.3)
PROT SERPL-MCNC: 8 G/DL — SIGNIFICANT CHANGE UP (ref 6.4–8.2)
RBC # BLD: 4.23 M/UL — SIGNIFICANT CHANGE UP (ref 4.2–5.8)
RBC # FLD: 13.9 % — SIGNIFICANT CHANGE UP (ref 10.3–14.5)
SODIUM SERPL-SCNC: 139 MMOL/L — SIGNIFICANT CHANGE UP (ref 132–145)
TROPONIN I, HIGH SENSITIVITY RESULT: 11.3 NG/L — SIGNIFICANT CHANGE UP
TSH SERPL-MCNC: 1.09 UIU/ML — SIGNIFICANT CHANGE UP (ref 0.36–3.74)
WBC # BLD: 2.72 K/UL — LOW (ref 3.8–10.5)
WBC # FLD AUTO: 2.72 K/UL — LOW (ref 3.8–10.5)

## 2025-08-12 PROCEDURE — 71045 X-RAY EXAM CHEST 1 VIEW: CPT | Mod: 26

## 2025-08-12 PROCEDURE — 99284 EMERGENCY DEPT VISIT MOD MDM: CPT | Mod: FS
